# Patient Record
Sex: MALE | Race: WHITE | Employment: UNEMPLOYED | URBAN - METROPOLITAN AREA
[De-identification: names, ages, dates, MRNs, and addresses within clinical notes are randomized per-mention and may not be internally consistent; named-entity substitution may affect disease eponyms.]

---

## 2017-01-13 ENCOUNTER — OFFICE VISIT (OUTPATIENT)
Dept: INTERNAL MEDICINE CLINIC | Age: 22
End: 2017-01-13

## 2017-01-13 VITALS
TEMPERATURE: 98.1 F | HEIGHT: 67 IN | OXYGEN SATURATION: 98 % | SYSTOLIC BLOOD PRESSURE: 108 MMHG | RESPIRATION RATE: 12 BRPM | HEART RATE: 70 BPM | WEIGHT: 145.6 LBS | DIASTOLIC BLOOD PRESSURE: 76 MMHG | BODY MASS INDEX: 22.85 KG/M2

## 2017-01-13 DIAGNOSIS — G47.9 SLEEP DISTURBANCE: ICD-10-CM

## 2017-01-13 DIAGNOSIS — R41.840 ATTENTION AND CONCENTRATION DEFICIT: Primary | ICD-10-CM

## 2017-01-13 RX ORDER — DEXTROAMPHETAMINE SACCHARATE, AMPHETAMINE ASPARTATE MONOHYDRATE, DEXTROAMPHETAMINE SULFATE AND AMPHETAMINE SULFATE 5; 5; 5; 5 MG/1; MG/1; MG/1; MG/1
20 CAPSULE, EXTENDED RELEASE ORAL
Qty: 30 CAP | Refills: 0 | Status: SHIPPED | OUTPATIENT
Start: 2017-01-13 | End: 2017-02-12

## 2017-01-13 RX ORDER — DEXTROAMPHETAMINE SACCHARATE, AMPHETAMINE ASPARTATE MONOHYDRATE, DEXTROAMPHETAMINE SULFATE AND AMPHETAMINE SULFATE 5; 5; 5; 5 MG/1; MG/1; MG/1; MG/1
20 CAPSULE, EXTENDED RELEASE ORAL
COMMUNITY
End: 2017-01-13 | Stop reason: SDUPTHER

## 2017-01-13 RX ORDER — DEXTROAMPHETAMINE SACCHARATE, AMPHETAMINE ASPARTATE MONOHYDRATE, DEXTROAMPHETAMINE SULFATE AND AMPHETAMINE SULFATE 5; 5; 5; 5 MG/1; MG/1; MG/1; MG/1
20 CAPSULE, EXTENDED RELEASE ORAL
Qty: 30 CAP | Refills: 0 | Status: SHIPPED | OUTPATIENT
Start: 2017-02-13 | End: 2017-03-15

## 2017-01-13 RX ORDER — DEXTROAMPHETAMINE SACCHARATE, AMPHETAMINE ASPARTATE MONOHYDRATE, DEXTROAMPHETAMINE SULFATE AND AMPHETAMINE SULFATE 5; 5; 5; 5 MG/1; MG/1; MG/1; MG/1
20 CAPSULE, EXTENDED RELEASE ORAL
Qty: 30 CAP | Refills: 0 | Status: SHIPPED | OUTPATIENT
Start: 2017-03-16 | End: 2017-04-15

## 2017-01-13 NOTE — PROGRESS NOTES
Chai Nathan is a 19 y.o. male who was seen in clinic today (1/13/2017). Assessment & Plan:  Vinod Fuller was seen today for attention deficit disorder. Diagnoses and all orders for this visit:    Attention and concentration deficit- stable, continue current treatment, using meds sporadically, no concerns, [facility]   -     amphetamine-dextroamphetamine XR (ADDERALL XR) 20 mg XR capsule; Take 1 Cap (20 mg total) by mouth every morningEarliest Fill Date: 1/13/17. Max Daily Amount: 20 mg  -     amphetamine-dextroamphetamine XR (ADDERALL XR) 20 mg XR capsule; Take 1 Cap (20 mg total) by mouth every morningEarliest Fill Date: 2/13/17. Max Daily Amount: 20 mg  -     amphetamine-dextroamphetamine XR (ADDERALL XR) 20 mg XR capsule; Take 1 Cap (20 mg total) by mouth every morningEarliest Fill Date: 3/16/17. Max Daily Amount: 20 mg    Sleep disturbance- pt denies any issues, reviewed neuropsych testing & sleep referral from 2-3 yrs ago. Recommended since it has been so long to get another opinion from a different sleep specialist to verify we are truly treating him correctly. Reviewed some disorders are treated w/ stimulants (in which case no changes). Pt was not happy about this requirement.  -     SLEEP MEDICINE REFERRAL         Follow-up Disposition:  Return in about 6 months (around 7/13/2017), or if symptoms worsen or fail to improve.       ----------------------------------------------------------------------    Subjective:  Mental Health Review  Patient is seen for ADHD. Current treatment regimen includes: Adderall XR. Medication compliance: weekends and school holidays off. He is using less frequently over breaks. Pt reports the following side effects: nothing. [facility]. Prior to Admission medications    Medication Sig Start Date End Date Taking? Authorizing Provider   amphetamine-dextroamphetamine XR (ADDERALL XR) 20 mg XR capsule Take 20 mg by mouth every morning.    Yes Historical Provider   valACYclovir (VALTREX) 500 mg tablet Take 1,000 mg by mouth two (2) times daily as needed. Yes Historical Provider   triamcinolone (NASACORT AQ) 55 mcg nasal inhaler 1 [place] by Nasal route daily as needed. Alternates nostrils   Yes Historical Provider          No Known Allergies        Review of Systems   Respiratory: Negative for cough and shortness of breath. Cardiovascular: Negative for chest pain and palpitations. Gastrointestinal: Negative for abdominal pain, constipation, diarrhea, nausea and vomiting. Neurological: Negative for tingling and headaches. Psychiatric/Behavioral: Negative for depression and memory loss. The patient is not nervous/anxious and does not have insomnia. Objective:   Physical Exam   Cardiovascular: Regular rhythm and normal heart sounds. No murmur heard. Pulmonary/Chest: Effort normal and breath sounds normal. He has no wheezes. He has no rales. Abdominal: Soft. Bowel sounds are normal. He exhibits no mass. There is no hepatosplenomegaly. There is no tenderness. Visit Vitals    /76    Pulse 70    Temp 98.1 °F (36.7 °C) (Oral)    Resp 12    Ht 5' 7\" (1.702 m)    Wt 145 lb 9.6 oz (66 kg)    SpO2 98%    BMI 22.8 kg/m2         Disclaimer:  Advised him to call back or return to office if symptoms worsen/change/persist.  Discussed expected course/resolution/complications of diagnosis in detail with patient. Medication risks/benefits/costs/interactions/alternatives discussed with patient. He was given an after visit summary which includes diagnoses, current medications, & vitals. He expressed understanding with the diagnosis and plan.         Loida Wynne MD

## 2017-07-11 ENCOUNTER — OFFICE VISIT (OUTPATIENT)
Dept: INTERNAL MEDICINE CLINIC | Age: 22
End: 2017-07-11

## 2017-07-11 VITALS
SYSTOLIC BLOOD PRESSURE: 92 MMHG | BODY MASS INDEX: 23.2 KG/M2 | TEMPERATURE: 98 F | OXYGEN SATURATION: 98 % | WEIGHT: 147.8 LBS | HEIGHT: 67 IN | DIASTOLIC BLOOD PRESSURE: 66 MMHG | HEART RATE: 72 BPM | RESPIRATION RATE: 16 BRPM

## 2017-07-11 DIAGNOSIS — R41.840 ATTENTION AND CONCENTRATION DEFICIT: Primary | ICD-10-CM

## 2017-07-11 RX ORDER — DEXTROAMPHETAMINE SACCHARATE, AMPHETAMINE ASPARTATE MONOHYDRATE, DEXTROAMPHETAMINE SULFATE AND AMPHETAMINE SULFATE 5; 5; 5; 5 MG/1; MG/1; MG/1; MG/1
20 CAPSULE, EXTENDED RELEASE ORAL DAILY
Qty: 30 CAP | Refills: 0 | Status: CANCELLED | OUTPATIENT
Start: 2017-08-10 | End: 2017-09-08

## 2017-07-11 RX ORDER — DEXTROAMPHETAMINE SACCHARATE, AMPHETAMINE ASPARTATE MONOHYDRATE, DEXTROAMPHETAMINE SULFATE AND AMPHETAMINE SULFATE 5; 5; 5; 5 MG/1; MG/1; MG/1; MG/1
20 CAPSULE, EXTENDED RELEASE ORAL DAILY
Qty: 30 CAP | Refills: 0 | Status: CANCELLED | OUTPATIENT
Start: 2017-09-09 | End: 2017-10-08

## 2017-07-11 RX ORDER — DEXTROAMPHETAMINE SACCHARATE, AMPHETAMINE ASPARTATE MONOHYDRATE, DEXTROAMPHETAMINE SULFATE AND AMPHETAMINE SULFATE 3.75; 3.75; 3.75; 3.75 MG/1; MG/1; MG/1; MG/1
15 CAPSULE, EXTENDED RELEASE ORAL DAILY
Qty: 30 CAP | Refills: 0 | Status: SHIPPED | OUTPATIENT
Start: 2017-07-11 | End: 2017-10-26 | Stop reason: SDUPTHER

## 2017-07-11 RX ORDER — DEXTROAMPHETAMINE SACCHARATE, AMPHETAMINE ASPARTATE MONOHYDRATE, DEXTROAMPHETAMINE SULFATE AND AMPHETAMINE SULFATE 5; 5; 5; 5 MG/1; MG/1; MG/1; MG/1
20 CAPSULE, EXTENDED RELEASE ORAL
COMMUNITY
End: 2017-07-11 | Stop reason: DRUGHIGH

## 2017-07-11 NOTE — PROGRESS NOTES
Raul Michaels is a 23 y.o. male who was seen in clinic today (7/11/2017). Assessment & Plan:  Diagnoses and all orders for this visit:    1. Attention and concentration deficit- stable, continue current treatment but will decrease from 20mg to 15mg, he did not see specialist as previously directed, reviewed at the rate he uses meds #30 tabs should last him 2-3 months. To get further refill he will need to set up appt w/ specialist.  Reviewed need to verify we are treating him properly especially after having side effects w/ meds. -     amphetamine-dextroamphetamine XR (ADDERALL XR) 15 mg XR capsule; Take 1 Cap (15 mg total) by mouth dailyEarliest Fill Date: 7/11/17. Max Daily Amount: 15 mg         Follow-up Disposition:  Return in about 6 months (around 1/11/2018). ----------------------------------------------------------------------    Subjective:  Arron Castillo was seen today for Attention Deficit Disorder    Mental Health Review  Patient is seen for ADHD. Current treatment regimen includes: Adderall XR. Medication compliance: he reports 2-3 times per week. Pt reports the following side effects: he reports dentist mentioned he was grinding his teeth, he monitored, and agrees he does clench his teeth. Grinding improves off the medication. [facility]. ----------------------------------------------------------------------      Prior to Admission medications    Medication Sig Start Date End Date Taking? Authorizing Provider   amphetamine-dextroamphetamine XR (ADDERALL XR) 20 mg XR capsule Take 20 mg by mouth every morning. Yes Historical Provider   valACYclovir (VALTREX) 500 mg tablet Take 1,000 mg by mouth two (2) times daily as needed. Yes Historical Provider   triamcinolone (NASACORT AQ) 55 mcg nasal inhaler 1 [place] by Nasal route daily as needed.  Alternates nostrils   Yes Historical Provider          No Known Allergies        Review of Systems   Respiratory: Negative for cough and shortness of breath. Cardiovascular: Negative for chest pain and palpitations. Gastrointestinal: Negative for abdominal pain, constipation, diarrhea, nausea and vomiting. Objective:   Physical Exam   Constitutional: No distress. Eyes: Conjunctivae are normal. No scleral icterus. Cardiovascular: Regular rhythm and normal heart sounds. No murmur heard. Pulmonary/Chest: Effort normal and breath sounds normal. He has no wheezes. He has no rales. Visit Vitals    BP 92/66    Pulse 72    Temp 98 °F (36.7 °C) (Oral)    Resp 16    Ht 5' 7\" (1.702 m)    Wt 147 lb 12.8 oz (67 kg)    SpO2 98%    BMI 23.15 kg/m2         Disclaimer:  Advised him to call back or return to office if symptoms worsen/change/persist.  Discussed expected course/resolution/complications of diagnosis in detail with patient. Medication risks/benefits/costs/interactions/alternatives discussed with patient. He was given an after visit summary which includes diagnoses, current medications, & vitals. He expressed understanding with the diagnosis and plan.         Ceferino Youssef MD

## 2017-09-19 ENCOUNTER — TELEPHONE (OUTPATIENT)
Dept: INTERNAL MEDICINE CLINIC | Age: 22
End: 2017-09-19

## 2017-09-19 NOTE — TELEPHONE ENCOUNTER
Patient has decided he would like to have a sleep study at [facility] and needs a referral.  He will pick this up at the .

## 2017-09-20 ENCOUNTER — TELEPHONE (OUTPATIENT)
Dept: INTERNAL MEDICINE CLINIC | Age: 22
End: 2017-09-20

## 2017-09-20 DIAGNOSIS — G47.9 SLEEP DISTURBANCE: Primary | ICD-10-CM

## 2017-10-26 ENCOUNTER — OFFICE VISIT (OUTPATIENT)
Dept: INTERNAL MEDICINE CLINIC | Age: 22
End: 2017-10-26

## 2017-10-26 VITALS
OXYGEN SATURATION: 99 % | HEART RATE: 60 BPM | WEIGHT: 148 LBS | RESPIRATION RATE: 12 BRPM | TEMPERATURE: 98.7 F | BODY MASS INDEX: 23.23 KG/M2 | SYSTOLIC BLOOD PRESSURE: 104 MMHG | DIASTOLIC BLOOD PRESSURE: 76 MMHG | HEIGHT: 67 IN

## 2017-10-26 DIAGNOSIS — R41.840 ATTENTION AND CONCENTRATION DEFICIT: Primary | ICD-10-CM

## 2017-10-26 RX ORDER — DEXTROAMPHETAMINE SACCHARATE, AMPHETAMINE ASPARTATE MONOHYDRATE, DEXTROAMPHETAMINE SULFATE AND AMPHETAMINE SULFATE 3.75; 3.75; 3.75; 3.75 MG/1; MG/1; MG/1; MG/1
15 CAPSULE, EXTENDED RELEASE ORAL
Qty: 30 CAP | Refills: 0 | Status: CANCELLED | OUTPATIENT
Start: 2017-12-25 | End: 2018-01-24

## 2017-10-26 RX ORDER — DEXTROAMPHETAMINE SACCHARATE, AMPHETAMINE ASPARTATE MONOHYDRATE, DEXTROAMPHETAMINE SULFATE AND AMPHETAMINE SULFATE 2.5; 2.5; 2.5; 2.5 MG/1; MG/1; MG/1; MG/1
10 CAPSULE, EXTENDED RELEASE ORAL DAILY
Qty: 30 CAP | Refills: 0 | Status: SHIPPED | OUTPATIENT
Start: 2017-10-26 | End: 2018-02-21 | Stop reason: SDUPTHER

## 2017-10-26 RX ORDER — DEXTROAMPHETAMINE SACCHARATE, AMPHETAMINE ASPARTATE MONOHYDRATE, DEXTROAMPHETAMINE SULFATE AND AMPHETAMINE SULFATE 3.75; 3.75; 3.75; 3.75 MG/1; MG/1; MG/1; MG/1
15 CAPSULE, EXTENDED RELEASE ORAL
Qty: 30 CAP | Refills: 0 | Status: CANCELLED | OUTPATIENT
Start: 2017-11-25 | End: 2017-12-25

## 2017-10-26 NOTE — MR AVS SNAPSHOT
Visit Information Date & Time Provider Department Dept. Phone Encounter #  
 10/26/2017  9:15 AM Denice Kwon, [address] [facility] [phone] Follow-up Instructions Return in about 6 months (around 4/26/2018) for Regular follow up. Upcoming Health Maintenance Date Due DTaP/Tdap/Td series (2 - Td) 4/7/2021 Allergies as of 10/26/2017  Review Complete On: 10/26/2017 By: Denice Kwon MD  
 No Known Allergies Current Immunizations  Reviewed on 10/26/2017 Name Date Hep A Vaccine 3/24/2009, 3/19/2007 Hep B Vaccine 1993, 1993, 1993 Influenza Vaccine 10/5/2017, 10/15/2016, 1/13/2015, 9/1/2013 MMR 4/10/1998, 7/11/1994 Meningococcal ACWY Vaccine 4/6/2012, 5/6/2005 TB Skin Test (PPD) Intradermal 1/20/2015 Tdap 4/7/2011 Varicella Virus Vaccine 6/10/2008, 5/15/1997 Reviewed by Shanta San RN on 10/26/2017 at  9:25 AM  
You Were Diagnosed With   
  
 Codes Comments Attention and concentration deficit    -  Primary ICD-10-CM: R41.840 ICD-9-CM: 799.51 Vitals BP Pulse Temp Resp Height(growth percentile) Weight(growth percentile) 104/76 60 98.7 °F (37.1 °C) (Oral) 12 5' 7\" (1.702 m) 148 lb (67.1 kg) SpO2 BMI Smoking Status 99% 23.18 kg/m2 Never Smoker Vitals History BMI and BSA Data Body Mass Index Body Surface Area  
 23.18 kg/m 2 1.78 m 2 Preferred Pharmacy Pharmacy Name Phone [facility] [phone] Your Updated Medication List  
  
   
This list is accurate as of: 10/26/17  9:44 AM.  Always use your most recent med list.  
  
  
  
  
 amphetamine-dextroamphetamine XR 10 mg XR capsule Commonly known as:  ADDERALL XR Take 1 Cap (10 mg total) by mouth dailyEarliest Fill Date: 10/26/17. Max Daily Amount: 10 mg  
  
 NASACORT AQ 55 mcg nasal inhaler Generic drug:  triamcinolone 1 [place] by Nasal route daily as needed. Alternates nostrils  
  
 valACYclovir 500 mg tablet Commonly known as:  VALTREX Take 1,000 mg by mouth two (2) times daily as needed. Prescriptions Printed Refills  
 amphetamine-dextroamphetamine XR (ADDERALL XR) 10 mg XR capsule 0 Sig: Take 1 Cap (10 mg total) by mouth dailyEarliest Fill Date: 10/26/17. Max Daily Amount: 10 mg  
 Class: Print Route: Oral  
  
Follow-up Instructions Return in about 6 months (around 4/26/2018) for Regular follow up. Patient Instructions Travel Clinic: 
 
[facility] in [place] [address] [phone] [address] [place] [address],[address] 
 [phone] [email or web address] Introducing [facility]! Dear Alie León: Thank you for requesting a [employer] account. Our records indicate that you already have an active [employer]ivivo account. You can access your account anytime at [email or web address] V I O/better. Did you know that you can access your hospital and ER discharge instructions at any time in [employer]ivivo? You can also review all of your test results from your hospital stay or ER visit. Additional Information If you have questions, please visit the Frequently Asked Questions section of the [employer] website at [email or web address] V I O/better./. Remember, [employer]ivivo is NOT to be used for urgent needs. For medical emergencies, dial 911. Now available from your iPhone and Android! Please provide this summary of care documentation to your next provider. Your primary care clinician is listed as Yassine Jaramillo. If you have any questions after today's visit, please call [phone].

## 2017-10-26 NOTE — PATIENT INSTRUCTIONS
Travel Clinic:    [facility] in [address] [place] [address]   [address]   [phone]    [email or web address] [employer]. [facility]

## 2017-10-26 NOTE — PROGRESS NOTES
Zully Blackman is a 23 y.o. male who was seen in clinic today (10/26/2017). Assessment & Plan:  Diagnoses and all orders for this visit:    1. Attention and concentration deficit- well controlled, continue current meds but will decrease from 15mg to 10mg as a trial.  He will update me in 2-3 wks. He has appt w/ [facility] sleep specialist set up.  -     amphetamine-dextroamphetamine XR (ADDERALL XR) 10 mg XR capsule; Take 1 Cap (10 mg total) by mouth dailyEarliest Fill Date: 10/26/17. Max Daily Amount: 10 mg         Follow-up Disposition:  Return in about 6 months (around 4/26/2018) for Regular follow up.       ----------------------------------------------------------------------    Subjective:  Jonah Caraballo was seen today for Attention Deficit Disorder and Sleep Problem    Mental Health Review  Patient is seen for ADHD. Current treatment regimen includes: Adderall XR. Medication compliance: taking it several day per week. He did run out last week. 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 Pt reports the following side effects: nothing. Dose was lowered at last visit due to grinding of the teeth and this has improved. He reports no significant changes on the lower dose. [facility] reviewed. He is going to [place] in January '18. Prior to Admission medications    Medication Sig Start Date End Date Taking? Authorizing Provider   amphetamine-dextroamphetamine XR (ADDERALL XR) 15 mg XR capsule Take 1 Cap (15 mg total) by mouth dailyEarliest Fill Date: 7/11/17. Max Daily Amount: 15 mg 7/11/17  Yes Loida Wynne MD   valACYclovir (VALTREX) 500 mg tablet Take 1,000 mg by mouth two (2) times daily as needed. Yes Historical Provider   triamcinolone (NASACORT AQ) 55 mcg nasal inhaler 1 [place] by Nasal route daily as needed.  Alternates nostrils   Yes Historical Provider          No Known Allergies        Review of Systems   Respiratory: Negative for cough and shortness of breath. Cardiovascular: Negative for chest pain and palpitations. Gastrointestinal: Negative for abdominal pain, constipation, diarrhea, nausea and vomiting. Objective:   Physical Exam   Constitutional: No distress. Cardiovascular: Regular rhythm and normal heart sounds. No murmur heard. Pulmonary/Chest: Effort normal and breath sounds normal. He has no wheezes. He has no rales. Abdominal: Soft. Bowel sounds are normal. He exhibits no mass. There is no hepatosplenomegaly. There is no tenderness. Psychiatric: He has a normal mood and affect. His behavior is normal.         Visit Vitals    /76    Pulse 60    Temp 98.7 °F (37.1 °C) (Oral)    Resp 12    Ht 5' 7\" (1.702 m)    Wt 148 lb (67.1 kg)    SpO2 99%    BMI 23.18 kg/m2         Disclaimer:  Advised him to call back or return to office if symptoms worsen/change/persist.  Discussed expected course/resolution/complications of diagnosis in detail with patient. Medication risks/benefits/costs/interactions/alternatives discussed with patient. He was given an after visit summary which includes diagnoses, current medications, & vitals. He expressed understanding with the diagnosis and plan.         Dylon Nunez MD

## 2017-12-20 ENCOUNTER — OFFICE VISIT (OUTPATIENT)
Dept: INTERNAL MEDICINE CLINIC | Age: 22
End: 2017-12-20

## 2017-12-20 VITALS
TEMPERATURE: 97.6 F | DIASTOLIC BLOOD PRESSURE: 76 MMHG | RESPIRATION RATE: 12 BRPM | BODY MASS INDEX: 24.01 KG/M2 | SYSTOLIC BLOOD PRESSURE: 104 MMHG | OXYGEN SATURATION: 98 % | HEIGHT: 67 IN | HEART RATE: 64 BPM | WEIGHT: 153 LBS

## 2017-12-20 DIAGNOSIS — R41.840 ATTENTION AND CONCENTRATION DEFICIT: Primary | ICD-10-CM

## 2017-12-20 RX ORDER — DEXTROAMPHETAMINE SACCHARATE, AMPHETAMINE ASPARTATE MONOHYDRATE, DEXTROAMPHETAMINE SULFATE AND AMPHETAMINE SULFATE 3.75; 3.75; 3.75; 3.75 MG/1; MG/1; MG/1; MG/1
15 CAPSULE, EXTENDED RELEASE ORAL
Qty: 20 CAP | Refills: 0 | Status: SHIPPED | OUTPATIENT
Start: 2017-12-20 | End: 2018-02-21 | Stop reason: SDUPTHER

## 2017-12-20 RX ORDER — DEXTROAMPHETAMINE SACCHARATE, AMPHETAMINE ASPARTATE MONOHYDRATE, DEXTROAMPHETAMINE SULFATE AND AMPHETAMINE SULFATE 2.5; 2.5; 2.5; 2.5 MG/1; MG/1; MG/1; MG/1
10 CAPSULE, EXTENDED RELEASE ORAL
Qty: 30 CAP | Refills: 0 | Status: CANCELLED | OUTPATIENT
Start: 2018-02-18 | End: 2018-03-20

## 2017-12-20 RX ORDER — DEXTROAMPHETAMINE SACCHARATE, AMPHETAMINE ASPARTATE MONOHYDRATE, DEXTROAMPHETAMINE SULFATE AND AMPHETAMINE SULFATE 3.75; 3.75; 3.75; 3.75 MG/1; MG/1; MG/1; MG/1
15 CAPSULE, EXTENDED RELEASE ORAL
Qty: 30 CAP | Refills: 0 | Status: SHIPPED | OUTPATIENT
Start: 2018-01-19 | End: 2018-02-20 | Stop reason: SDUPTHER

## 2017-12-20 RX ORDER — DEXTROAMPHETAMINE SACCHARATE, AMPHETAMINE ASPARTATE MONOHYDRATE, DEXTROAMPHETAMINE SULFATE AND AMPHETAMINE SULFATE 2.5; 2.5; 2.5; 2.5 MG/1; MG/1; MG/1; MG/1
10 CAPSULE, EXTENDED RELEASE ORAL
COMMUNITY
End: 2017-12-20 | Stop reason: SDUPTHER

## 2017-12-20 NOTE — Clinical Note
Fax my note & neuropsych testing to [facility] sleep specialist (note is in my green folder)

## 2017-12-20 NOTE — PROGRESS NOTES
Leoncio Call is a 23 y.o. male who was seen in clinic today (12/20/2017). Assessment & Plan:   Diagnoses and all orders for this visit:    1. Attention and concentration deficit- slightly worse, will increase dose back to 15mg, [facility] and [facility] reviewed. -     amphetamine-dextroamphetamine XR (ADDERALL XR) 15 mg XR capsule; Take 1 Cap (15 mg total) by mouth every morningEarliest Fill Date: 1/19/18. Max Daily Amount: 15 mg  -     amphetamine-dextroamphetamine XR (ADDERALL XR) 15 mg XR capsule; Take 1 Cap (15 mg total) by mouth every morning. Max Daily Amount: 15 mg      [facility] records reviewed. Will send copy of my note and copy of previous neuropsychology testing to him for his review and input. Follow-up Disposition:  Return in about 6 months (around 6/20/2018) for Regular follow up. Subjective:   Nick Mera was seen today for Attention Deficit Disorder    Mental Health Review  Patient is seen for ADHD. Current treatment regimen includes: Adderall XR. At last visit we decreased dose from 15mg to 10mg. he reports meds are wearing off sooner. He reports slight worsening of concentration. Medication compliance: weekends and school holidays off. Pt reports the following side effects: nothing- grinding of his teeth has helped. [facility]. [facility], notes reviewed. He has home sleep study set up for Jan '18. Prior to Admission medications    Medication Sig Start Date End Date Taking? Authorizing Provider   amphetamine-dextroamphetamine XR (ADDERALL XR) 10 mg XR capsule Take 10 mg by mouth every morning. Yes Historical Provider   valACYclovir (VALTREX) 500 mg tablet Take 1,000 mg by mouth two (2) times daily as needed. Yes Historical Provider   triamcinolone (NASACORT AQ) 55 mcg nasal inhaler 1 [place] by Nasal route daily as needed.  Alternates nostrils   Yes Historical Provider          No Known Allergies Review of Systems   Respiratory: Negative for cough and shortness of breath. Cardiovascular: Negative for chest pain and palpitations. Gastrointestinal: Negative for abdominal pain, constipation, diarrhea, nausea and vomiting. Objective:   Physical Exam   Constitutional: No distress. Cardiovascular: Regular rhythm and normal heart sounds. No murmur heard. Pulmonary/Chest: Effort normal and breath sounds normal.   Psychiatric: He has a normal mood and affect. His behavior is normal.         Visit Vitals    /76    Pulse 64    Temp 97.6 °F (36.4 °C) (Oral)    Resp 12    Ht 5' 7\" (1.702 m)    Wt 153 lb (69.4 kg)    SpO2 98%    BMI 23.96 kg/m2         Disclaimer:  Advised him to call back or return to office if symptoms worsen/change/persist.  Discussed expected course/resolution/complications of diagnosis in detail with patient. Medication risks/benefits/costs/interactions/alternatives discussed with patient. He was given an after visit summary which includes diagnoses, current medications, & vitals. He expressed understanding with the diagnosis and plan. Aspects of this note may have been generated using voice recognition software. Despite editing, there may be some syntax errors.        Tracie Murguia MD

## 2017-12-21 ENCOUNTER — TELEPHONE (OUTPATIENT)
Dept: INTERNAL MEDICINE CLINIC | Age: 22
End: 2017-12-21

## 2017-12-21 NOTE — TELEPHONE ENCOUNTER
Records faxed per order of Dr. Daniel Botello, last office note and neuropsych testing. Confirmation received.

## 2017-12-26 ENCOUNTER — TELEPHONE (OUTPATIENT)
Dept: INTERNAL MEDICINE CLINIC | Age: 22
End: 2017-12-26

## 2017-12-26 RX ORDER — AZITHROMYCIN 500 MG/1
500 TABLET, FILM COATED ORAL DAILY
Qty: 3 TAB | Refills: 0 | Status: SHIPPED | OUTPATIENT
Start: 2017-12-26 | End: 2017-12-29

## 2017-12-26 NOTE — TELEPHONE ENCOUNTER
Called pt and pt stated he went to [address] and they recommended pt take an antibiotic for his travel to [place].

## 2017-12-26 NOTE — TELEPHONE ENCOUNTER
The patient went to [facility] and they recommend antibiotic.  Can you call this in for me

## 2017-12-26 NOTE — TELEPHONE ENCOUNTER
Yinka [address] to clarify what pt needed for his travel to [place]. Spoke with nurse practioner and she recommended for pt to take an otc anitdiarrheal and azithromycin 500 mg # 3 tabs for onset of diarrhea for pt PCP to prescribe.

## 2018-02-20 DIAGNOSIS — R41.840 ATTENTION AND CONCENTRATION DEFICIT: ICD-10-CM

## 2018-02-21 RX ORDER — DEXTROAMPHETAMINE SACCHARATE, AMPHETAMINE ASPARTATE MONOHYDRATE, DEXTROAMPHETAMINE SULFATE AND AMPHETAMINE SULFATE 3.75; 3.75; 3.75; 3.75 MG/1; MG/1; MG/1; MG/1
15 CAPSULE, EXTENDED RELEASE ORAL DAILY
Qty: 30 CAP | Refills: 0 | Status: SHIPPED | OUTPATIENT
Start: 2018-04-22 | End: 2018-07-30 | Stop reason: SDUPTHER

## 2018-02-21 RX ORDER — DEXTROAMPHETAMINE SACCHARATE, AMPHETAMINE ASPARTATE MONOHYDRATE, DEXTROAMPHETAMINE SULFATE AND AMPHETAMINE SULFATE 3.75; 3.75; 3.75; 3.75 MG/1; MG/1; MG/1; MG/1
15 CAPSULE, EXTENDED RELEASE ORAL
Qty: 30 CAP | Refills: 0 | Status: SHIPPED | OUTPATIENT
Start: 2018-02-21 | End: 2018-03-23

## 2018-02-21 RX ORDER — DEXTROAMPHETAMINE SACCHARATE, AMPHETAMINE ASPARTATE MONOHYDRATE, DEXTROAMPHETAMINE SULFATE AND AMPHETAMINE SULFATE 3.75; 3.75; 3.75; 3.75 MG/1; MG/1; MG/1; MG/1
15 CAPSULE, EXTENDED RELEASE ORAL
Qty: 30 CAP | Refills: 0 | Status: SHIPPED | OUTPATIENT
Start: 2018-03-23 | End: 2018-04-22

## 2018-07-30 ENCOUNTER — OFFICE VISIT (OUTPATIENT)
Dept: INTERNAL MEDICINE CLINIC | Age: 23
End: 2018-07-30

## 2018-07-30 VITALS
SYSTOLIC BLOOD PRESSURE: 96 MMHG | RESPIRATION RATE: 14 BRPM | HEART RATE: 60 BPM | HEIGHT: 67 IN | WEIGHT: 147 LBS | TEMPERATURE: 97.6 F | BODY MASS INDEX: 23.07 KG/M2 | OXYGEN SATURATION: 98 % | DIASTOLIC BLOOD PRESSURE: 66 MMHG

## 2018-07-30 DIAGNOSIS — R41.840 ATTENTION AND CONCENTRATION DEFICIT: Primary | ICD-10-CM

## 2018-07-30 DIAGNOSIS — G47.9 SLEEP DISTURBANCE: ICD-10-CM

## 2018-07-30 RX ORDER — DEXTROAMPHETAMINE SACCHARATE, AMPHETAMINE ASPARTATE MONOHYDRATE, DEXTROAMPHETAMINE SULFATE AND AMPHETAMINE SULFATE 3.75; 3.75; 3.75; 3.75 MG/1; MG/1; MG/1; MG/1
15 CAPSULE, EXTENDED RELEASE ORAL
Qty: 30 CAP | Refills: 0 | Status: SHIPPED | OUTPATIENT
Start: 2018-09-28 | End: 2018-10-28

## 2018-07-30 RX ORDER — DEXTROAMPHETAMINE SACCHARATE, AMPHETAMINE ASPARTATE MONOHYDRATE, DEXTROAMPHETAMINE SULFATE AND AMPHETAMINE SULFATE 3.75; 3.75; 3.75; 3.75 MG/1; MG/1; MG/1; MG/1
15 CAPSULE, EXTENDED RELEASE ORAL
Qty: 30 CAP | Refills: 0 | Status: SHIPPED | OUTPATIENT
Start: 2018-08-29 | End: 2018-09-28

## 2018-07-30 RX ORDER — DEXTROAMPHETAMINE SACCHARATE, AMPHETAMINE ASPARTATE MONOHYDRATE, DEXTROAMPHETAMINE SULFATE AND AMPHETAMINE SULFATE 3.75; 3.75; 3.75; 3.75 MG/1; MG/1; MG/1; MG/1
15 CAPSULE, EXTENDED RELEASE ORAL DAILY
Qty: 30 CAP | Refills: 0 | Status: SHIPPED | OUTPATIENT
Start: 2018-07-30 | End: 2018-08-29

## 2018-07-30 NOTE — PROGRESS NOTES
Ricardo Contreras is a 20 y.o. male who was seen in clinic today (7/30/2018). He is planning on applying to [address] [place] [address] [employer] for next summer. Assessment & Plan:   Diagnoses and all orders for this visit:    1. Attention and concentration deficit- well controlled, continue current treatment, [facility] & refills have been sporadic, depending on scheduling using more regularly then at other times. No changes. -     amphetamine-dextroamphetamine XR (ADDERALL XR) 15 mg XR capsule; Take 1 Cap (15 mg total) by mouth dailyEarliest Fill Date: 7/30/18. Max Daily Amount: 15 mg  -     amphetamine-dextroamphetamine XR (ADDERALL XR) 15 mg XR capsule; Take 1 Cap (15 mg total) by mouth every morningEarliest Fill Date: 8/29/18. Max Daily Amount: 15 mg  -     amphetamine-dextroamphetamine XR (ADDERALL XR) 15 mg XR capsule; Take 1 Cap (15 mg total) by mouth every morningEarliest Fill Date: 9/28/18. Max Daily Amount: 15 mg    2. Sleep disturbance- no recent records, will obtain [facility], he reports was normal.         Follow-up Disposition:  Return if symptoms worsen or fail to improve. Subjective:   Alyssa Allen was seen today for Attention Deficit Disorder    Mental Health Review  Patient is seen for ADHD. Since last visit he had a good time in [place]. He reports completing sleep study and told it was normal.  Current treatment regimen includes: Adderall. Medication compliance: weekends and school holidays off. He is using meds more regularly now. Pt reports the following side effects: nothing. [facility]. Prior to Admission medications    Medication Sig Start Date End Date Taking? Authorizing Provider   amphetamine-dextroamphetamine XR (ADDERALL XR) 15 mg XR capsule Take 1 Cap (15 mg total) by mouth dailyEarliest Fill Date: 4/22/18. Max Daily Amount: 15 mg 4/22/18  Yes Magnus Wallace MD   valACYclovir (VALTREX) 500 mg tablet Take 1,000 mg by mouth two (2) times daily as needed. Yes Historical Provider   triamcinolone (NASACORT AQ) 55 mcg nasal inhaler 1 [place] by Nasal route daily as needed. Alternates nostrils   Yes Historical Provider          No Known Allergies        Review of Systems   Respiratory: Negative for cough and shortness of breath. Cardiovascular: Negative for chest pain and palpitations. Gastrointestinal: Negative for abdominal pain, constipation, diarrhea, nausea and vomiting. Objective:   Physical Exam   Constitutional: No distress. Cardiovascular: Regular rhythm and normal heart sounds. No murmur heard. Pulmonary/Chest: Effort normal and breath sounds normal. He has no wheezes. He has no rales. Abdominal: Soft. Bowel sounds are normal. He exhibits no mass. There is no hepatosplenomegaly. There is no tenderness. Psychiatric: He has a normal mood and affect. His behavior is normal.         Visit Vitals    BP 96/66    Pulse 60    Temp 97.6 °F (36.4 °C) (Oral)    Resp 14    Ht 5' 7\" (1.702 m)    Wt 147 lb (66.7 kg)    SpO2 98%    BMI 23.02 kg/m2         Disclaimer:  Advised him to call back or return to office if symptoms worsen/change/persist.  Discussed expected course/resolution/complications of diagnosis in detail with patient. Medication risks/benefits/costs/interactions/alternatives discussed with patient. He was given an after visit summary which includes diagnoses, current medications, & vitals. He expressed understanding with the diagnosis and plan. Aspects of this note may have been generated using voice recognition software. Despite editing, there may be some syntax errors.        Inez Saldivar MD

## 2018-11-27 ENCOUNTER — PATIENT MESSAGE (OUTPATIENT)
Dept: INTERNAL MEDICINE CLINIC | Age: 23
End: 2018-11-27

## 2018-11-27 ENCOUNTER — OFFICE VISIT (OUTPATIENT)
Dept: INTERNAL MEDICINE CLINIC | Age: 23
End: 2018-11-27

## 2018-11-27 VITALS
OXYGEN SATURATION: 98 % | BODY MASS INDEX: 23.7 KG/M2 | TEMPERATURE: 98.1 F | HEART RATE: 60 BPM | SYSTOLIC BLOOD PRESSURE: 96 MMHG | DIASTOLIC BLOOD PRESSURE: 68 MMHG | HEIGHT: 67 IN | WEIGHT: 151 LBS | RESPIRATION RATE: 18 BRPM

## 2018-11-27 DIAGNOSIS — J06.9 VIRAL UPPER RESPIRATORY TRACT INFECTION: ICD-10-CM

## 2018-11-27 DIAGNOSIS — R41.840 ATTENTION AND CONCENTRATION DEFICIT: Primary | ICD-10-CM

## 2018-11-27 LAB
S PYO AG THROAT QL: NEGATIVE
VALID INTERNAL CONTROL?: YES

## 2018-11-27 RX ORDER — DEXTROAMPHETAMINE SACCHARATE, AMPHETAMINE ASPARTATE MONOHYDRATE, DEXTROAMPHETAMINE SULFATE AND AMPHETAMINE SULFATE 3.75; 3.75; 3.75; 3.75 MG/1; MG/1; MG/1; MG/1
15 CAPSULE, EXTENDED RELEASE ORAL
Qty: 30 CAP | Refills: 0 | Status: SHIPPED | OUTPATIENT
Start: 2018-12-27 | End: 2019-01-26

## 2018-11-27 RX ORDER — DEXTROAMPHETAMINE SACCHARATE, AMPHETAMINE ASPARTATE MONOHYDRATE, DEXTROAMPHETAMINE SULFATE AND AMPHETAMINE SULFATE 3.75; 3.75; 3.75; 3.75 MG/1; MG/1; MG/1; MG/1
15 CAPSULE, EXTENDED RELEASE ORAL
Qty: 30 CAP | Refills: 0 | Status: SHIPPED | OUTPATIENT
Start: 2019-01-26 | End: 2019-05-20 | Stop reason: SDUPTHER

## 2018-11-27 RX ORDER — DEXTROAMPHETAMINE SACCHARATE, AMPHETAMINE ASPARTATE MONOHYDRATE, DEXTROAMPHETAMINE SULFATE AND AMPHETAMINE SULFATE 3.75; 3.75; 3.75; 3.75 MG/1; MG/1; MG/1; MG/1
15 CAPSULE, EXTENDED RELEASE ORAL
Qty: 30 CAP | Refills: 0 | Status: SHIPPED | OUTPATIENT
Start: 2018-11-27 | End: 2018-12-27

## 2018-11-27 RX ORDER — DEXTROAMPHETAMINE SACCHARATE, AMPHETAMINE ASPARTATE MONOHYDRATE, DEXTROAMPHETAMINE SULFATE AND AMPHETAMINE SULFATE 3.75; 3.75; 3.75; 3.75 MG/1; MG/1; MG/1; MG/1
15 CAPSULE, EXTENDED RELEASE ORAL
COMMUNITY
End: 2018-11-27 | Stop reason: SDUPTHER

## 2018-11-27 NOTE — PROGRESS NOTES
Amy Castillo is a 20 y.o. male who was seen in clinic today (11/27/2018). Assessment & Plan:   Diagnoses and all orders for this visit:    1. Attention and concentration deficit- well controlled, continue current treatment, [facility]  reviewed   -     amphetamine-dextroamphetamine XR (ADDERALL XR) 15 mg XR capsule; Take 1 Cap (15 mg total) by mouth every morningEarliest Fill Date: 11/27/18. Max Daily Amount: 15 mg  -     amphetamine-dextroamphetamine XR (ADDERALL XR) 15 mg XR capsule; Take 1 Cap (15 mg total) by mouth every morningEarliest Fill Date: 12/27/18. Max Daily Amount: 15 mg  -     amphetamine-dextroamphetamine XR (ADDERALL XR) 15 mg XR capsule; Take 1 Cap (15 mg total) by mouth every morningEarliest Fill Date: 1/26/19. Max Daily Amount: 15 mg    2. Viral upper respiratory tract infection- symptoms: fluctuating, discussed differential diagnosis and at this time favor a viral etiology. Discussed diagnosis & treatment options and reviewed the importance of avoiding unnecessary antibiotic therapy, reviewed which OTC medications to use and avoid, expected time course for resolution & red flags were reviewed with him to RTC or notify me. -     AMB POC RAPID STREP A  ---> NEGATIVE          Follow-up Disposition:  Return in about 6 months (around 5/27/2019) for Regular follow up. Subjective:   Gwen Franco was seen today for Attention Deficit Disorder and Cold Symptoms    Mental Health Review  Patient is seen for ADHD. Current treatment regimen includes: Adderall XR. Medication compliance: weekends and school holidays off. Pt reports the following side effects: nothing. VA  reviewed. URI Review  Gwen Franco returns to clinic today to talk about: URI symptoms for 4 days ago, which are unchanged since that time. He reports sore throat, dry cough, headache, chills and sinus congestion. He denies a history of: fever, chest congestion, wheezing and SOB/DODD.   Treatments have included: sudafed which have been somewhat effective. Relevant PMH: No pertinent additional PMH. Patient reports sick contacts: no.  Started after [facility] Friday shopping. Brief Labs:   No results found for: NA, K, CREA, CREATEXT, CHOL, HDL, LDLC, LDL, LDLCEXT, TRIGL, HBA1C, [id number], TSH, CYO2TPVW, TSHEXT       Prior to Admission medications    Medication Sig Start Date End Date Taking? Authorizing Provider   amphetamine-dextroamphetamine XR (ADDERALL XR) 15 mg XR capsule Take 15 mg by mouth every morning. Yes Provider, Historical   valACYclovir (VALTREX) 500 mg tablet Take 1,000 mg by mouth two (2) times daily as needed. Yes Provider, Historical   triamcinolone (NASACORT AQ) 55 mcg nasal inhaler 1 [place] by Nasal route daily as needed. Alternates nostrils   Yes Provider, Historical          No Known Allergies        Review of Systems   Respiratory: Negative for cough and shortness of breath. Cardiovascular: Negative for chest pain and palpitations. Gastrointestinal: Negative for abdominal pain, constipation, diarrhea, nausea and vomiting. Objective:   Physical Exam   Constitutional: No distress. HENT:   Right Ear: Tympanic membrane is not erythematous and not bulging. No middle ear effusion. Left Ear: Tympanic membrane is not erythematous and not bulging. No middle ear effusion. Nose: No mucosal edema or rhinorrhea. Right sinus exhibits no maxillary sinus tenderness and no frontal sinus tenderness. Left sinus exhibits no maxillary sinus tenderness and no frontal sinus tenderness. Mouth/Throat: Uvula is midline and mucous membranes are normal. Posterior oropharyngeal erythema present. No oropharyngeal exudate. Eyes: Conjunctivae are normal. No scleral icterus. Neck: Neck supple. Cardiovascular: Regular rhythm and normal heart sounds. No murmur heard. Pulmonary/Chest: Effort normal and breath sounds normal. He has no wheezes. He has no rales.    Lymphadenopathy:     He has no cervical adenopathy. Visit Vitals  BP 96/68   Pulse 60   Temp 98.1 °F (36.7 °C) (Oral)   Resp 18   Ht 5' 7\" (1.702 m)   Wt 151 lb (68.5 kg)   SpO2 98%   BMI 23.65 kg/m²         Disclaimer:  Advised him to call back or return to office if symptoms worsen/change/persist.  Discussed expected course/resolution/complications of diagnosis in detail with patient. Medication risks/benefits/costs/interactions/alternatives discussed with patient. He was given an after visit summary which includes diagnoses, current medications, & vitals. He expressed understanding with the diagnosis and plan. Aspects of this note may have been generated using voice recognition software. Despite editing, there may be some syntax errors.        Alirio Fields MD

## 2018-11-27 NOTE — PATIENT INSTRUCTIONS

## 2018-11-27 NOTE — TELEPHONE ENCOUNTER
Attempted to reach patient to advise strep test neg, VM full. Dr. Poornima Perez had sent [facility] message.

## 2019-05-20 ENCOUNTER — OFFICE VISIT (OUTPATIENT)
Dept: INTERNAL MEDICINE CLINIC | Age: 24
End: 2019-05-20

## 2019-05-20 VITALS
OXYGEN SATURATION: 98 % | TEMPERATURE: 98 F | BODY MASS INDEX: 24.14 KG/M2 | RESPIRATION RATE: 14 BRPM | HEIGHT: 67 IN | SYSTOLIC BLOOD PRESSURE: 104 MMHG | DIASTOLIC BLOOD PRESSURE: 68 MMHG | HEART RATE: 84 BPM | WEIGHT: 153.8 LBS

## 2019-05-20 DIAGNOSIS — B00.1 RECURRENT COLD SORES: ICD-10-CM

## 2019-05-20 DIAGNOSIS — Z13.220 LIPID SCREENING: ICD-10-CM

## 2019-05-20 DIAGNOSIS — R41.840 ATTENTION AND CONCENTRATION DEFICIT: Primary | ICD-10-CM

## 2019-05-20 DIAGNOSIS — Z51.81 MEDICATION MONITORING ENCOUNTER: ICD-10-CM

## 2019-05-20 RX ORDER — DEXTROAMPHETAMINE SACCHARATE, AMPHETAMINE ASPARTATE MONOHYDRATE, DEXTROAMPHETAMINE SULFATE AND AMPHETAMINE SULFATE 3.75; 3.75; 3.75; 3.75 MG/1; MG/1; MG/1; MG/1
15 CAPSULE, EXTENDED RELEASE ORAL
Qty: 30 CAP | Refills: 0 | Status: SHIPPED | OUTPATIENT
Start: 2019-06-19 | End: 2019-07-19

## 2019-05-20 RX ORDER — DEXTROAMPHETAMINE SACCHARATE, AMPHETAMINE ASPARTATE MONOHYDRATE, DEXTROAMPHETAMINE SULFATE AND AMPHETAMINE SULFATE 3.75; 3.75; 3.75; 3.75 MG/1; MG/1; MG/1; MG/1
15 CAPSULE, EXTENDED RELEASE ORAL
Qty: 30 CAP | Refills: 0 | Status: SHIPPED | OUTPATIENT
Start: 2019-07-19 | End: 2019-09-10 | Stop reason: SDUPTHER

## 2019-05-20 RX ORDER — VALACYCLOVIR HYDROCHLORIDE 500 MG/1
1000 TABLET, FILM COATED ORAL
Qty: 12 TAB | Refills: 0 | Status: SHIPPED | OUTPATIENT
Start: 2019-05-20

## 2019-05-20 RX ORDER — DEXTROAMPHETAMINE SACCHARATE, AMPHETAMINE ASPARTATE MONOHYDRATE, DEXTROAMPHETAMINE SULFATE AND AMPHETAMINE SULFATE 3.75; 3.75; 3.75; 3.75 MG/1; MG/1; MG/1; MG/1
15 CAPSULE, EXTENDED RELEASE ORAL
Qty: 30 CAP | Refills: 0 | Status: SHIPPED | OUTPATIENT
Start: 2019-05-20 | End: 2019-06-19

## 2019-05-20 NOTE — PROGRESS NOTES
Leoncio Call is a 30 y.o. male who was seen in clinic today (2019). Assessment & Plan:   Diagnoses and all orders for this visit:    1. Attention and concentration deficit- stable, continue current treatment, [facility]  reviewed   -     amphetamine-dextroamphetamine XR (ADDERALL XR) 15 mg XR capsule; Take 1 Cap by mouth every morning for 30 days. Max Daily Amount: 15 mg.  -     amphetamine-dextroamphetamine XR (ADDERALL XR) 15 mg XR capsule; Take 1 Cap by mouth every morning for 30 days. Max Daily Amount: 15 mg.  -     amphetamine-dextroamphetamine XR (ADDERALL XR) 15 mg XR capsule; Take 1 Cap by mouth every morning. Max Daily Amount: 15 mg.    2. Recurrent cold sores- meds , has not used recently, refilled to have on hand  -     valACYclovir (VALTREX) 500 mg tablet; Take 2 Tabs by mouth two (2) times daily as needed (cold sores). 3. Lipid screening  -     LIPID PANEL    4. Medication monitoring encounter  -     METABOLIC PANEL, COMPREHENSIVE  -     CBC W/O DIFF      Follow-up and Dispositions    · Follow up - 6 months            Subjective:   Nick Mera was seen today for Attention Deficit Disorder    Mental Health Review  Patient is seen for ADHD. Current treatment regimen includes: Adderall XR. Medication compliance: weekends and school holidays off. Pt reports the following side effects: nothing. [facility]  reviewed. Brief Labs:   No results found for: NA, K, CREA, CREATEXT, CHOL, HDL, LDLC, LDL, LDLCEXT, TRIGL, HBA1C, [id number], TSH, OOA6VNXL, TSHEXT       Prior to Admission medications    Medication Sig Start Date End Date Taking? Authorizing Provider   amphetamine-dextroamphetamine XR (ADDERALL XR) 15 mg XR capsule Take 1 Cap (15 mg total) by mouth every morningEarliest Fill Date: 19. Max Daily Amount: 15 mg 19  Yes Brooklyn Quinones MD   valACYclovir (VALTREX) 500 mg tablet Take 1,000 mg by mouth two (2) times daily as needed.    Yes Provider, Historical          No Known Allergies        Review of Systems   Respiratory: Negative for cough and shortness of breath. Cardiovascular: Negative for chest pain and palpitations. Gastrointestinal: Negative for abdominal pain, constipation, diarrhea, nausea and vomiting. Objective:   Physical Exam   Constitutional: No distress. Eyes: Conjunctivae are normal. No scleral icterus. Cardiovascular: Regular rhythm and normal heart sounds. No murmur heard. Pulmonary/Chest: Effort normal and breath sounds normal. He has no wheezes. He has no rales. Abdominal: Soft. Bowel sounds are normal. He exhibits no mass. There is no hepatosplenomegaly. There is no tenderness. Musculoskeletal: He exhibits no edema. Psychiatric: He has a normal mood and affect. His behavior is normal.         Visit Vitals  /68   Pulse 84   Temp 98 °F (36.7 °C) (Oral)   Resp 14   Ht 5' 7\" (1.702 m)   Wt 153 lb 12.8 oz (69.8 kg)   SpO2 98%   BMI 24.09 kg/m²         Disclaimer:  Advised him to call back or return to office if symptoms worsen/change/persist.  Discussed expected course/resolution/complications of diagnosis in detail with patient. Medication risks/benefits/costs/interactions/alternatives discussed with patient. He was given an after visit summary which includes diagnoses, current medications, & vitals. He expressed understanding with the diagnosis and plan. Aspects of this note may have been generated using voice recognition software. Despite editing, there may be some syntax errors.        Jana Bragg MD

## 2019-09-10 ENCOUNTER — TELEPHONE (OUTPATIENT)
Dept: INTERNAL MEDICINE CLINIC | Age: 24
End: 2019-09-10

## 2019-09-10 DIAGNOSIS — R41.840 ATTENTION AND CONCENTRATION DEFICIT: ICD-10-CM

## 2019-09-10 RX ORDER — DEXTROAMPHETAMINE SACCHARATE, AMPHETAMINE ASPARTATE MONOHYDRATE, DEXTROAMPHETAMINE SULFATE AND AMPHETAMINE SULFATE 3.75; 3.75; 3.75; 3.75 MG/1; MG/1; MG/1; MG/1
15 CAPSULE, EXTENDED RELEASE ORAL
Qty: 30 CAP | Refills: 0 | Status: SHIPPED | OUTPATIENT
Start: 2019-10-10 | End: 2019-09-25 | Stop reason: SDUPTHER

## 2019-09-10 RX ORDER — DEXTROAMPHETAMINE SACCHARATE, AMPHETAMINE ASPARTATE MONOHYDRATE, DEXTROAMPHETAMINE SULFATE AND AMPHETAMINE SULFATE 3.75; 3.75; 3.75; 3.75 MG/1; MG/1; MG/1; MG/1
15 CAPSULE, EXTENDED RELEASE ORAL
Qty: 30 CAP | Refills: 0 | Status: SHIPPED | OUTPATIENT
Start: 2019-09-10 | End: 2019-09-25 | Stop reason: SDUPTHER

## 2019-09-11 NOTE — TELEPHONE ENCOUNTER
Patients mailbox is full, other number is not in service. Sent [employer] message that RX is ready for .

## 2019-09-25 ENCOUNTER — OFFICE VISIT (OUTPATIENT)
Dept: INTERNAL MEDICINE CLINIC | Age: 24
End: 2019-09-25

## 2019-09-25 VITALS
DIASTOLIC BLOOD PRESSURE: 60 MMHG | HEART RATE: 56 BPM | RESPIRATION RATE: 16 BRPM | SYSTOLIC BLOOD PRESSURE: 98 MMHG | OXYGEN SATURATION: 98 % | BODY MASS INDEX: 23.83 KG/M2 | TEMPERATURE: 97.8 F | WEIGHT: 151.8 LBS | HEIGHT: 67 IN

## 2019-09-25 DIAGNOSIS — R41.840 ATTENTION AND CONCENTRATION DEFICIT: ICD-10-CM

## 2019-09-25 RX ORDER — DEXTROAMPHETAMINE SACCHARATE, AMPHETAMINE ASPARTATE MONOHYDRATE, DEXTROAMPHETAMINE SULFATE AND AMPHETAMINE SULFATE 3.75; 3.75; 3.75; 3.75 MG/1; MG/1; MG/1; MG/1
15 CAPSULE, EXTENDED RELEASE ORAL
Qty: 30 CAP | Refills: 0 | Status: SHIPPED | OUTPATIENT
Start: 2019-11-24 | End: 2020-02-04 | Stop reason: SDUPTHER

## 2019-09-25 RX ORDER — DEXTROAMPHETAMINE SACCHARATE, AMPHETAMINE ASPARTATE MONOHYDRATE, DEXTROAMPHETAMINE SULFATE AND AMPHETAMINE SULFATE 3.75; 3.75; 3.75; 3.75 MG/1; MG/1; MG/1; MG/1
15 CAPSULE, EXTENDED RELEASE ORAL
Qty: 30 CAP | Refills: 0 | Status: SHIPPED | OUTPATIENT
Start: 2019-10-25 | End: 2020-02-04 | Stop reason: SDUPTHER

## 2019-09-25 RX ORDER — DEXTROAMPHETAMINE SACCHARATE, AMPHETAMINE ASPARTATE MONOHYDRATE, DEXTROAMPHETAMINE SULFATE AND AMPHETAMINE SULFATE 3.75; 3.75; 3.75; 3.75 MG/1; MG/1; MG/1; MG/1
15 CAPSULE, EXTENDED RELEASE ORAL
Qty: 30 CAP | Refills: 0 | Status: SHIPPED | OUTPATIENT
Start: 2019-09-25 | End: 2020-02-04 | Stop reason: SDUPTHER

## 2019-09-25 NOTE — PROGRESS NOTES
Marina Oshea is a 30 y.o. male who was seen in clinic today (9/25/2019). Assessment & Plan:     Diagnoses and all orders for this visit:    1. Attention and concentration deficit- well controlled, continue current treatment pending, [facility]  reviewed  -     amphetamine-dextroamphetamine XR (ADDERALL XR) 15 mg XR capsule; Take 1 Cap by mouth every morning for 30 days. Max Daily Amount: 15 mg.  -     amphetamine-dextroamphetamine XR (ADDERALL XR) 15 mg XR capsule; Take 1 Cap by mouth every morning for 30 days. Max Daily Amount: 15 mg.  -     amphetamine-dextroamphetamine XR (ADDERALL XR) 15 mg XR capsule; Take 1 Cap by mouth every morning for 30 days. Max Daily Amount: 15 mg. He is fasting and will get labs from last visit done today. Follow-up and Dispositions    · Return in about 6 months (around 3/25/2020) for Regular follow up. Subjective:   Renetta Dumont was seen today for Attention Deficit Disorder    Mental Health Review  Patient is seen for ADHD. Current treatment regimen includes: Adderall XR. Medication compliance: weekends and school holidays off. Just using meds on work days. Has been out of meds x 4 days. Pt reports the following side effects: nothing. [facility]  reviewed. Brief Labs:   No results found for: NA, K, CREA, CREATEXT, CHOL, HDL, LDLC, LDL, LDLCEXT, TRIGL, HBA1C, [id number], TSH, BTX2SEXD, TSHEXT       Prior to Admission medications    Medication Sig Start Date End Date Taking? Authorizing Provider   amphetamine-dextroamphetamine XR (ADDERALL XR) 15 mg XR capsule Take 1 Cap by mouth every morning for 30 days. Max Daily Amount: 15 mg. 9/10/19 10/10/19 Yes Chiquita Gardner MD   valACYclovir (VALTREX) 500 mg tablet Take 2 Tabs by mouth two (2) times daily as needed (cold sores). 5/20/19  Yes Chiquita Gardner MD          No Known Allergies        Review of Systems   Respiratory: Negative for cough and shortness of breath.     Cardiovascular: Negative for chest pain and palpitations. Gastrointestinal: Negative for abdominal pain, constipation, diarrhea, nausea and vomiting. Objective:   Physical Exam   Cardiovascular: Regular rhythm and normal heart sounds. Bradycardia present. No murmur heard. Pulmonary/Chest: Effort normal and breath sounds normal. He has no wheezes. He has no rales. Abdominal: Soft. Bowel sounds are normal. He exhibits no mass. There is no hepatosplenomegaly. There is no tenderness. Visit Vitals  BP 98/60   Pulse (!) 56   Temp 97.8 °F (36.6 °C) (Oral)   Resp 16   Ht 5' 7\" (1.702 m)   Wt 151 lb 12.8 oz (68.9 kg)   SpO2 98%   BMI 23.78 kg/m²         Disclaimer:  Advised him to call back or return to office if symptoms worsen/change/persist.  Discussed expected course/resolution/complications of diagnosis in detail with patient. Medication risks/benefits/costs/interactions/alternatives discussed with patient. He was given an after visit summary which includes diagnoses, current medications, & vitals. He expressed understanding with the diagnosis and plan. Aspects of this note may have been generated using voice recognition software. Despite editing, there may be some syntax errors.        Gabe Lopez MD

## 2019-09-26 LAB
ALBUMIN SERPL-MCNC: 5.1 G/DL (ref 3.5–5.5)
ALBUMIN/GLOB SERPL: 2 {RATIO} (ref 1.2–2.2)
ALP SERPL-CCNC: 72 IU/L (ref 39–117)
ALT SERPL-CCNC: 20 IU/L (ref 0–44)
AST SERPL-CCNC: 19 IU/L (ref 0–40)
BILIRUB SERPL-MCNC: 0.4 MG/DL (ref 0–1.2)
BUN SERPL-MCNC: 10 MG/DL (ref 6–20)
BUN/CREAT SERPL: 11 (ref 9–20)
CALCIUM SERPL-MCNC: 10.1 MG/DL (ref 8.7–10.2)
CHLORIDE SERPL-SCNC: 102 MMOL/L (ref 96–106)
CHOLEST SERPL-MCNC: 185 MG/DL (ref 100–199)
CO2 SERPL-SCNC: 22 MMOL/L (ref 20–29)
CREAT SERPL-MCNC: 0.94 MG/DL (ref 0.76–1.27)
ERYTHROCYTE [DISTWIDTH] IN BLOOD BY AUTOMATED COUNT: 13.6 % (ref 12.3–15.4)
GLOBULIN SER CALC-MCNC: 2.6 G/DL (ref 1.5–4.5)
GLUCOSE SERPL-MCNC: 83 MG/DL (ref 65–99)
HCT VFR BLD AUTO: 46.6 % (ref 37.5–51)
HDLC SERPL-MCNC: 48 MG/DL
HGB BLD-MCNC: 16.1 G/DL (ref 13–17.7)
LDLC SERPL CALC-MCNC: 121 MG/DL (ref 0–99)
MCH RBC QN AUTO: 29.9 PG (ref 26.6–33)
MCHC RBC AUTO-ENTMCNC: 34.5 G/DL (ref 31.5–35.7)
MCV RBC AUTO: 87 FL (ref 79–97)
PLATELET # BLD AUTO: 212 X10E3/UL (ref 150–450)
POTASSIUM SERPL-SCNC: 4.8 MMOL/L (ref 3.5–5.2)
PROT SERPL-MCNC: 7.7 G/DL (ref 6–8.5)
RBC # BLD AUTO: 5.39 X10E6/UL (ref 4.14–5.8)
SODIUM SERPL-SCNC: 142 MMOL/L (ref 134–144)
TRIGL SERPL-MCNC: 79 MG/DL (ref 0–149)
VLDLC SERPL CALC-MCNC: 16 MG/DL (ref 5–40)
WBC # BLD AUTO: 5.2 X10E3/UL (ref 3.4–10.8)

## 2019-09-26 NOTE — PROGRESS NOTES
Results released to patient via [email or web address]. All labs are stable or at goal for him. Ordered on 5/20, completed on 9/25.

## 2020-02-04 ENCOUNTER — OFFICE VISIT (OUTPATIENT)
Dept: INTERNAL MEDICINE CLINIC | Age: 25
End: 2020-02-04

## 2020-02-04 VITALS
TEMPERATURE: 98.4 F | WEIGHT: 165 LBS | OXYGEN SATURATION: 97 % | DIASTOLIC BLOOD PRESSURE: 68 MMHG | BODY MASS INDEX: 25.9 KG/M2 | RESPIRATION RATE: 16 BRPM | SYSTOLIC BLOOD PRESSURE: 116 MMHG | HEART RATE: 97 BPM | HEIGHT: 67 IN

## 2020-02-04 DIAGNOSIS — R41.840 ATTENTION AND CONCENTRATION DEFICIT: Primary | ICD-10-CM

## 2020-02-04 DIAGNOSIS — E66.3 OVERWEIGHT (BMI 25.0-29.9): ICD-10-CM

## 2020-02-04 RX ORDER — DEXTROAMPHETAMINE SACCHARATE, AMPHETAMINE ASPARTATE MONOHYDRATE, DEXTROAMPHETAMINE SULFATE AND AMPHETAMINE SULFATE 3.75; 3.75; 3.75; 3.75 MG/1; MG/1; MG/1; MG/1
15 CAPSULE, EXTENDED RELEASE ORAL
Qty: 30 CAP | Refills: 0 | Status: CANCELLED | OUTPATIENT
Start: 2020-03-05 | End: 2020-04-04

## 2020-02-04 RX ORDER — DEXTROAMPHETAMINE SACCHARATE, AMPHETAMINE ASPARTATE MONOHYDRATE, DEXTROAMPHETAMINE SULFATE AND AMPHETAMINE SULFATE 3.75; 3.75; 3.75; 3.75 MG/1; MG/1; MG/1; MG/1
15 CAPSULE, EXTENDED RELEASE ORAL
Qty: 30 CAP | Refills: 0 | Status: CANCELLED | OUTPATIENT
Start: 2020-04-04 | End: 2020-05-04

## 2020-02-04 RX ORDER — DEXTROAMPHETAMINE SACCHARATE, AMPHETAMINE ASPARTATE MONOHYDRATE, DEXTROAMPHETAMINE SULFATE AND AMPHETAMINE SULFATE 3.75; 3.75; 3.75; 3.75 MG/1; MG/1; MG/1; MG/1
15 CAPSULE, EXTENDED RELEASE ORAL
COMMUNITY
End: 2020-06-10 | Stop reason: SDUPTHER

## 2020-02-04 RX ORDER — DEXTROAMPHETAMINE SACCHARATE, AMPHETAMINE ASPARTATE MONOHYDRATE, DEXTROAMPHETAMINE SULFATE AND AMPHETAMINE SULFATE 3.75; 3.75; 3.75; 3.75 MG/1; MG/1; MG/1; MG/1
15 CAPSULE, EXTENDED RELEASE ORAL
Qty: 30 CAP | Refills: 0 | Status: SHIPPED | OUTPATIENT
Start: 2020-03-05 | End: 2020-04-04

## 2020-02-04 RX ORDER — DEXTROAMPHETAMINE SACCHARATE, AMPHETAMINE ASPARTATE MONOHYDRATE, DEXTROAMPHETAMINE SULFATE AND AMPHETAMINE SULFATE 3.75; 3.75; 3.75; 3.75 MG/1; MG/1; MG/1; MG/1
15 CAPSULE, EXTENDED RELEASE ORAL
Qty: 30 CAP | Refills: 0 | Status: CANCELLED | OUTPATIENT
Start: 2020-02-04 | End: 2020-03-05

## 2020-02-04 RX ORDER — DEXTROAMPHETAMINE SACCHARATE, AMPHETAMINE ASPARTATE MONOHYDRATE, DEXTROAMPHETAMINE SULFATE AND AMPHETAMINE SULFATE 3.75; 3.75; 3.75; 3.75 MG/1; MG/1; MG/1; MG/1
15 CAPSULE, EXTENDED RELEASE ORAL
Qty: 30 CAP | Refills: 0 | Status: SHIPPED | OUTPATIENT
Start: 2020-04-04 | End: 2020-05-04

## 2020-02-04 RX ORDER — DEXTROAMPHETAMINE SACCHARATE, AMPHETAMINE ASPARTATE MONOHYDRATE, DEXTROAMPHETAMINE SULFATE AND AMPHETAMINE SULFATE 3.75; 3.75; 3.75; 3.75 MG/1; MG/1; MG/1; MG/1
15 CAPSULE, EXTENDED RELEASE ORAL
Qty: 30 CAP | Refills: 0 | Status: SHIPPED | OUTPATIENT
Start: 2020-02-04 | End: 2020-03-05

## 2020-02-04 NOTE — PROGRESS NOTES
Ambrosio Vega is a 30 y.o. male who was seen in clinic today (2/4/2020). Assessment & Plan:   Diagnoses and all orders for this visit:    1. Attention and concentration deficit- well controlled, continue current treatment, [facility]  reviewed   -     amphetamine-dextroamphetamine XR (ADDERALL XR) 15 mg XR capsule; Take 1 Cap by mouth every morning for 30 days. Max Daily Amount: 15 mg.  -     amphetamine-dextroamphetamine XR (ADDERALL XR) 15 mg XR capsule; Take 1 Cap by mouth every morning for 30 days. Max Daily Amount: 15 mg.  -     amphetamine-dextroamphetamine XR (ADDERALL XR) 15 mg XR capsule; Take 1 Cap by mouth every morning for 30 days. Max Daily Amount: 15 mg.    2. Overweight (BMI 25.0-29.9) -new dx, weight is up 10-15 lbs, I have recommended the following interventions: encourage exercise and lifestyle education regarding diet. Follow-up and Dispositions    · Return if symptoms worsen or fail to improve. Subjective:   Franci Barrett was seen today for Attention Deficit Disorder    Mental Health Review  Patient is seen for ADHD. Current treatment regimen includes: Adderall XR. Medication compliance: weekends and school holidays off. Pt reports the following side effects: nothing. [facility]  reviewed. Brief Labs:     Lab Results   Component Value Date/Time    Sodium 142 09/25/2019 10:08 AM    Potassium 4.8 09/25/2019 10:08 AM    Creatinine 0.94 09/25/2019 10:08 AM    Cholesterol, total 185 09/25/2019 10:08 AM    HDL Cholesterol 48 09/25/2019 10:08 AM    LDL, calculated 121 09/25/2019 10:08 AM    Triglyceride 79 09/25/2019 10:08 AM          Prior to Admission medications    Medication Sig Start Date End Date Taking? Authorizing Provider   amphetamine-dextroamphetamine XR (ADDERALL XR) 15 mg XR capsule Take 15 mg by mouth every morning. Yes Provider, Historical   valACYclovir (VALTREX) 500 mg tablet Take 2 Tabs by mouth two (2) times daily as needed (cold sores).  5/20/19  Yes Devi Benson, Tremaine Smiley MD          No Known Allergies        Review of Systems   Respiratory: Negative for cough and shortness of breath. Cardiovascular: Negative for chest pain and palpitations. Gastrointestinal: Negative for abdominal pain, constipation, diarrhea, nausea and vomiting. Psychiatric/Behavioral: Negative for depression. The patient is not nervous/anxious and does not have insomnia. Objective:   Physical Exam  Cardiovascular:      Rate and Rhythm: Regular rhythm. Heart sounds: Normal heart sounds. No murmur. Pulmonary:      Effort: Pulmonary effort is normal.      Breath sounds: Normal breath sounds. No wheezing or rales. Abdominal:      General: Bowel sounds are normal.      Palpations: Abdomen is soft. There is no mass. Tenderness: There is no abdominal tenderness. Visit Vitals  /68   Pulse 97   Temp 98.4 °F (36.9 °C) (Oral)   Resp 16   Ht 5' 7\" (1.702 m)   Wt 165 lb (74.8 kg)   SpO2 97%   BMI 25.84 kg/m²         Disclaimer:  Advised him to call back or return to office if symptoms worsen/change/persist.  Discussed expected course/resolution/complications of diagnosis in detail with patient. Medication risks/benefits/costs/interactions/alternatives discussed with patient. He was given an after visit summary which includes diagnoses, current medications, & vitals. He expressed understanding with the diagnosis and plan. Aspects of this note may have been generated using voice recognition software. Despite editing, there may be some syntax errors.        Hayley Serrato MD

## 2020-06-10 ENCOUNTER — VIRTUAL VISIT (OUTPATIENT)
Dept: INTERNAL MEDICINE CLINIC | Age: 25
End: 2020-06-10

## 2020-06-10 DIAGNOSIS — R41.840 ATTENTION AND CONCENTRATION DEFICIT: Primary | ICD-10-CM

## 2020-06-10 DIAGNOSIS — Z71.89 EDUCATED ABOUT COVID-19 VIRUS INFECTION: ICD-10-CM

## 2020-06-10 RX ORDER — DEXTROAMPHETAMINE SACCHARATE, AMPHETAMINE ASPARTATE MONOHYDRATE, DEXTROAMPHETAMINE SULFATE AND AMPHETAMINE SULFATE 3.75; 3.75; 3.75; 3.75 MG/1; MG/1; MG/1; MG/1
15 CAPSULE, EXTENDED RELEASE ORAL
Qty: 30 CAP | Refills: 0 | Status: CANCELLED | OUTPATIENT
Start: 2020-08-09

## 2020-06-10 RX ORDER — DEXTROAMPHETAMINE SACCHARATE, AMPHETAMINE ASPARTATE MONOHYDRATE, DEXTROAMPHETAMINE SULFATE AND AMPHETAMINE SULFATE 3.75; 3.75; 3.75; 3.75 MG/1; MG/1; MG/1; MG/1
15 CAPSULE, EXTENDED RELEASE ORAL
Qty: 30 CAP | Refills: 0 | Status: SHIPPED | OUTPATIENT
Start: 2020-07-10 | End: 2020-12-16 | Stop reason: SDUPTHER

## 2020-06-10 RX ORDER — DEXTROAMPHETAMINE SACCHARATE, AMPHETAMINE ASPARTATE MONOHYDRATE, DEXTROAMPHETAMINE SULFATE AND AMPHETAMINE SULFATE 3.75; 3.75; 3.75; 3.75 MG/1; MG/1; MG/1; MG/1
15 CAPSULE, EXTENDED RELEASE ORAL
Qty: 30 CAP | Refills: 0 | Status: SHIPPED | OUTPATIENT
Start: 2020-06-10 | End: 2020-12-16 | Stop reason: SDUPTHER

## 2020-06-10 NOTE — PROGRESS NOTES
Ashwin Garcia is a 30 y.o. male who was seen by synchronous (real-time) audio-video technology on 6/10/2020. Consent: Ashwin Garcia who was seen by synchronous (real-time) audio-video technology, and/or his healthcare decision maker, is aware that this patient-initiated, Telehealth encounter on 6/10/2020 is a billable service, with coverage as determined by his insurance carrier. He is aware that he may receive a bill and has provided verbal consent to proceed: Yes. Patient identification was verified prior to start of the visit. A caregiver was present when appropriate. Due to this being a TeleHealth encounter (during [facility] emergency), evaluation of the following organ systems was limited: VS/Constituional/EENT/Resp/CV/GI//MS/Neuro/Skin/Heme-Lymph-Imm. Pursuant to the emergency declaration under the [facility] [place] [id number] waiver authority and the [employer] Act, this Virtual Visit was conducted, with patient's (and/or legal guardian's) consent, to reduce the patient's risk of exposure to COVID-19 and provide necessary medical care. Services were provided through a synchronous discussion virtually to substitute for in-person clinic visit. I was in the office. The patient was at home. Assessment & Plan:     Diagnoses and all orders for this visit:    1. Attention and concentration deficit- well controlled, continue current treatment  -     amphetamine-dextroamphetamine XR (Adderall XR) 15 mg XR capsule; Take 1 Cap by mouth every morning for 30 days. Max Daily Amount: 15 mg.  -     amphetamine-dextroamphetamine XR (ADDERALL XR) 15 mg XR capsule; Take 1 Cap by mouth every morning for 30 days. Max Daily Amount: 15 mg.    2. Educated about COVID-19 virus infection- reviewed guidelines and recommendations, he is using a mask, he is back to work, s/s of infection reviewed.       Follow-up and Dispositions    · Return in about 6 months (around 12/10/2020) for Regular follow up. Subjective:   Edie Mancia was seen for Attention Deficit Disorder    Mental Health Review  Patient is seen for ADHD. Current treatment regimen includes: Adderall XR. Medication compliance: weekends and school holidays off. Pt reports the following side effects: nothing. [facility]  reviewed. Lat filled on 5/8 and 3/30. Last PDMP Patito Castro as Reviewed:  Review User Review Instant Review Result   ASHWINI KULKARNI FREDY 6/10/2020  9:33 AM Reviewed PDMP [1]            Prior to Admission medications    Medication Sig Start Date End Date Taking? Authorizing Provider   amphetamine-dextroamphetamine XR (ADDERALL XR) 15 mg XR capsule Take 15 mg by mouth every morning. Yes Provider, Historical   valACYclovir (VALTREX) 500 mg tablet Take 2 Tabs by mouth two (2) times daily as needed (cold sores). 5/20/19  Yes Atul Hernandez MD       No Known Allergies      Review of Systems   Constitutional: Negative for chills and fever. Respiratory: Negative for cough and shortness of breath. Cardiovascular: Negative for chest pain and palpitations. Gastrointestinal: Negative for abdominal pain, constipation, diarrhea, nausea and vomiting. Neurological: Negative for headaches. Psychiatric/Behavioral: Negative for depression (mood slightly worse when working at home, but improved now back to work). The patient is not nervous/anxious. Objective:     General: alert, cooperative, no distress   Mental  status: normal mood, behavior, speech, dress, motor activity, and thought processes   Eyes: normal sclera   Mouth:    Neck:    Resp: normal effort and no respiratory distress   Neuro: no gross deficits   Musculoskeletal:    Skin:    Psychiatric: normal affect         We discussed the expected course, resolution and complications of the diagnosis(es) in detail.   Medication risks, benefits, costs, interactions, and alternatives were discussed as indicated. I advised him to contact the office if his condition worsens, changes or fails to improve as anticipated. He expressed understanding with the diagnosis(es) and plan.      Yevgeniy Ferguson MD

## 2020-09-01 ENCOUNTER — OFFICE VISIT (OUTPATIENT)
Dept: INTERNAL MEDICINE CLINIC | Age: 25
End: 2020-09-01
Payer: COMMERCIAL

## 2020-09-01 VITALS
TEMPERATURE: 97.8 F | WEIGHT: 164 LBS | HEART RATE: 69 BPM | BODY MASS INDEX: 25.74 KG/M2 | SYSTOLIC BLOOD PRESSURE: 101 MMHG | RESPIRATION RATE: 14 BRPM | DIASTOLIC BLOOD PRESSURE: 64 MMHG | OXYGEN SATURATION: 97 % | HEIGHT: 67 IN

## 2020-09-01 DIAGNOSIS — R41.840 ATTENTION AND CONCENTRATION DEFICIT: Primary | ICD-10-CM

## 2020-09-01 PROCEDURE — 99213 OFFICE O/P EST LOW 20 MIN: CPT | Performed by: INTERNAL MEDICINE

## 2020-09-01 RX ORDER — DEXTROAMPHETAMINE SACCHARATE, AMPHETAMINE ASPARTATE MONOHYDRATE, DEXTROAMPHETAMINE SULFATE AND AMPHETAMINE SULFATE 3.75; 3.75; 3.75; 3.75 MG/1; MG/1; MG/1; MG/1
15 CAPSULE, EXTENDED RELEASE ORAL
COMMUNITY
End: 2020-09-01 | Stop reason: SDUPTHER

## 2020-09-01 RX ORDER — DEXTROAMPHETAMINE SACCHARATE, AMPHETAMINE ASPARTATE MONOHYDRATE, DEXTROAMPHETAMINE SULFATE AND AMPHETAMINE SULFATE 3.75; 3.75; 3.75; 3.75 MG/1; MG/1; MG/1; MG/1
15 CAPSULE, EXTENDED RELEASE ORAL
Qty: 30 CAP | Refills: 0 | Status: SHIPPED | OUTPATIENT
Start: 2020-10-01 | End: 2020-10-31

## 2020-09-01 RX ORDER — DEXTROAMPHETAMINE SACCHARATE, AMPHETAMINE ASPARTATE MONOHYDRATE, DEXTROAMPHETAMINE SULFATE AND AMPHETAMINE SULFATE 3.75; 3.75; 3.75; 3.75 MG/1; MG/1; MG/1; MG/1
15 CAPSULE, EXTENDED RELEASE ORAL
Qty: 30 CAP | Refills: 0 | Status: SHIPPED | OUTPATIENT
Start: 2020-10-31 | End: 2020-12-15 | Stop reason: SDUPTHER

## 2020-09-01 RX ORDER — DEXTROAMPHETAMINE SACCHARATE, AMPHETAMINE ASPARTATE MONOHYDRATE, DEXTROAMPHETAMINE SULFATE AND AMPHETAMINE SULFATE 3.75; 3.75; 3.75; 3.75 MG/1; MG/1; MG/1; MG/1
15 CAPSULE, EXTENDED RELEASE ORAL
Qty: 30 CAP | Refills: 0 | Status: SHIPPED | OUTPATIENT
Start: 2020-09-01 | End: 2021-03-18 | Stop reason: SDUPTHER

## 2020-09-01 NOTE — PROGRESS NOTES
Madhavi Rivera is a 30 y.o. male who was seen in clinic today (9/1/2020). Assessment & Plan:     Diagnoses and all orders for this visit:    1. Attention and concentration deficit- well controlled, continue current treatment   -     amphetamine-dextroamphetamine XR (Adderall XR) 15 mg XR capsule; Take 1 Cap by mouth every morning for 30 days. Max Daily Amount: 15 mg.  -     amphetamine-dextroamphetamine XR (ADDERALL XR) 15 mg XR capsule; Take 1 Cap by mouth every morning for 30 days. Max Daily Amount: 15 mg.  -     amphetamine-dextroamphetamine XR (ADDERALL XR) 15 mg XR capsule; Take 1 Cap by mouth every morning. Max Daily Amount: 15 mg. Follow-up and Dispositions    · Return in about 6 months (around 3/1/2021) for Regular follow up. Subjective:   Ghislaine Ortiz was seen today for Attention Deficit Disorder        Since last visit: had some GI issues, tested negative for COVID. Resolved w/ Mag Citrate. No further issues. Mental Health Review  Patient is seen for ADHD. Current treatment regimen includes: Adderall XR. Medication compliance: all of the time. Pt reports the following side effects: nothing. [facility]  reviewed. Last PDMP Robert Trezevant as Reviewed:  Review User Review Instant Review Result   ASHWINI KULKARNI 9/1/2020  8:15 AM Reviewed PDMP [1]          Prior to Admission medications    Medication Sig Start Date End Date Taking? Authorizing Provider   amphetamine-dextroamphetamine XR (Adderall XR) 15 mg XR capsule Take 15 mg by mouth every morning. Yes Provider, Historical   valACYclovir (VALTREX) 500 mg tablet Take 2 Tabs by mouth two (2) times daily as needed (cold sores). 5/20/19  Yes Esdras Espinal MD          No Known Allergies        Review of Systems   Constitutional: Negative for chills, fever and malaise/fatigue. Respiratory: Negative for cough and shortness of breath. Cardiovascular: Negative for chest pain and palpitations.    Gastrointestinal: Negative for abdominal pain, constipation, diarrhea, nausea and vomiting. Psychiatric/Behavioral: Negative for depression. The patient is not nervous/anxious and does not have insomnia. Objective:   Physical Exam  Cardiovascular:      Rate and Rhythm: Regular rhythm. Heart sounds: Normal heart sounds. No murmur. Pulmonary:      Effort: Pulmonary effort is normal.      Breath sounds: Normal breath sounds. No wheezing or rales. Abdominal:      General: Bowel sounds are normal.      Palpations: Abdomen is soft. There is no mass. Tenderness: There is no abdominal tenderness. Psychiatric:         Mood and Affect: Mood normal.         Behavior: Behavior normal.           Visit Vitals  /64   Pulse 69   Temp 97.8 °F (36.6 °C) (Oral)   Resp 14   Ht 5' 7\" (1.702 m)   Wt 164 lb (74.4 kg)   SpO2 97%   BMI 25.69 kg/m²         Disclaimer:  Advised him to call back or return to office if symptoms worsen/change/persist.  Discussed expected course/resolution/complications of diagnosis in detail with patient. Medication risks/benefits/costs/interactions/alternatives discussed with patient. He was given an after visit summary which includes diagnoses, current medications, & vitals. He expressed understanding with the diagnosis and plan. Aspects of this note may have been generated using voice recognition software. Despite editing, there may be some syntax errors.        Dion Zarate MD

## 2020-12-15 DIAGNOSIS — R41.840 ATTENTION AND CONCENTRATION DEFICIT: ICD-10-CM

## 2020-12-16 RX ORDER — DEXTROAMPHETAMINE SACCHARATE, AMPHETAMINE ASPARTATE MONOHYDRATE, DEXTROAMPHETAMINE SULFATE AND AMPHETAMINE SULFATE 3.75; 3.75; 3.75; 3.75 MG/1; MG/1; MG/1; MG/1
15 CAPSULE, EXTENDED RELEASE ORAL
Qty: 30 CAP | Refills: 0 | Status: SHIPPED | OUTPATIENT
Start: 2021-02-14 | End: 2021-06-30 | Stop reason: SDUPTHER

## 2020-12-16 RX ORDER — DEXTROAMPHETAMINE SACCHARATE, AMPHETAMINE ASPARTATE MONOHYDRATE, DEXTROAMPHETAMINE SULFATE AND AMPHETAMINE SULFATE 3.75; 3.75; 3.75; 3.75 MG/1; MG/1; MG/1; MG/1
15 CAPSULE, EXTENDED RELEASE ORAL
Qty: 30 CAP | Refills: 0 | Status: SHIPPED | OUTPATIENT
Start: 2021-01-15 | End: 2021-03-18 | Stop reason: SDUPTHER

## 2020-12-16 RX ORDER — DEXTROAMPHETAMINE SACCHARATE, AMPHETAMINE ASPARTATE MONOHYDRATE, DEXTROAMPHETAMINE SULFATE AND AMPHETAMINE SULFATE 3.75; 3.75; 3.75; 3.75 MG/1; MG/1; MG/1; MG/1
15 CAPSULE, EXTENDED RELEASE ORAL
Qty: 30 CAP | Refills: 0 | Status: SHIPPED | OUTPATIENT
Start: 2020-12-16 | End: 2021-03-18 | Stop reason: SDUPTHER

## 2020-12-16 NOTE — TELEPHONE ENCOUNTER
Chart & VA  reviewed. Due for f/u in 3 months Last visit with me:  9/1/2020 Last refilled on: 11/14/20 No future appointments. Last PDMP Aden Manzano as Reviewed: 
Review User Review Instant Review Result ASHWINI KULKARNI 12/16/2020  9:40 AM Reviewed PDMP [1]

## 2021-03-18 ENCOUNTER — E-VISIT (OUTPATIENT)
Dept: INTERNAL MEDICINE CLINIC | Age: 26
End: 2021-03-18
Payer: COMMERCIAL

## 2021-03-18 DIAGNOSIS — R41.840 ATTENTION AND CONCENTRATION DEFICIT: Primary | ICD-10-CM

## 2021-03-18 PROCEDURE — 99421 OL DIG E/M SVC 5-10 MIN: CPT | Performed by: INTERNAL MEDICINE

## 2021-03-18 RX ORDER — DEXTROAMPHETAMINE SACCHARATE, AMPHETAMINE ASPARTATE MONOHYDRATE, DEXTROAMPHETAMINE SULFATE AND AMPHETAMINE SULFATE 3.75; 3.75; 3.75; 3.75 MG/1; MG/1; MG/1; MG/1
15 CAPSULE, EXTENDED RELEASE ORAL
Qty: 30 CAP | Refills: 0 | Status: SHIPPED | OUTPATIENT
Start: 2021-03-18 | End: 2021-04-17

## 2021-03-18 RX ORDER — DEXTROAMPHETAMINE SACCHARATE, AMPHETAMINE ASPARTATE MONOHYDRATE, DEXTROAMPHETAMINE SULFATE AND AMPHETAMINE SULFATE 3.75; 3.75; 3.75; 3.75 MG/1; MG/1; MG/1; MG/1
15 CAPSULE, EXTENDED RELEASE ORAL
Qty: 30 CAP | Refills: 0 | Status: SHIPPED | OUTPATIENT
Start: 2021-05-17 | End: 2021-10-01 | Stop reason: SDUPTHER

## 2021-03-18 RX ORDER — DEXTROAMPHETAMINE SACCHARATE, AMPHETAMINE ASPARTATE MONOHYDRATE, DEXTROAMPHETAMINE SULFATE AND AMPHETAMINE SULFATE 3.75; 3.75; 3.75; 3.75 MG/1; MG/1; MG/1; MG/1
15 CAPSULE, EXTENDED RELEASE ORAL
Qty: 30 CAP | Refills: 0 | Status: SHIPPED | OUTPATIENT
Start: 2021-04-17 | End: 2021-10-01 | Stop reason: SDUPTHER

## 2021-03-19 NOTE — TELEPHONE ENCOUNTER
E-Visit Note: HPI: per patient questionnaire, this has been reviewed. EXAM: n/a Chart & VA  reviewed. Last visit with me:  9/1/2020 Last refilled on: 2/20/21 No future appointments. Last PDMP Julia  as Reviewed: 
Review User Review Instant Review Result ASHWINI KULKARNI 3/18/2021  8:02 PM Reviewed PDMP [1]  
 
 
 
---------------------------------- 
Diagnoses and all orders for this visit: 1. Attention and concentration deficit 
-     amphetamine-dextroamphetamine XR (Adderall XR) 15 mg XR capsule; Take 1 Cap by mouth every morning for 30 days. Max Daily Amount: 15 mg. 
-     amphetamine-dextroamphetamine XR (ADDERALL XR) 15 mg XR capsule; Take 1 Cap by mouth every morning for 30 days. Max Daily Amount: 15 mg. 
-     amphetamine-dextroamphetamine XR (Adderall XR) 15 mg XR capsule; Take 1 Cap by mouth every morning for 30 days. Max Daily Amount: 15 mg. PLAN:  
Meds refilled x 3 months. No red flags. ---------------------------------- The patient was advised to call if these symptoms worsen or fail to improve as anticipated. 5-10 minutes were spent on the digital evaluation and management of this patient.   
 
 
Lorri Arambula MD

## 2021-06-04 ENCOUNTER — TELEPHONE (OUTPATIENT)
Dept: INTERNAL MEDICINE CLINIC | Age: 26
End: 2021-06-04

## 2021-06-24 DIAGNOSIS — R41.840 ATTENTION AND CONCENTRATION DEFICIT: ICD-10-CM

## 2021-06-24 RX ORDER — DEXTROAMPHETAMINE SACCHARATE, AMPHETAMINE ASPARTATE MONOHYDRATE, DEXTROAMPHETAMINE SULFATE AND AMPHETAMINE SULFATE 3.75; 3.75; 3.75; 3.75 MG/1; MG/1; MG/1; MG/1
15 CAPSULE, EXTENDED RELEASE ORAL
Qty: 30 CAPSULE | Refills: 0 | OUTPATIENT
Start: 2021-06-24 | End: 2021-07-24

## 2021-06-24 NOTE — TELEPHONE ENCOUNTER
Pt has had 2 no shows since last refill. Can not sent in medications until he is seen in the office and we discuss coming into scheduled appointments. This is a controlled medications.

## 2021-06-24 NOTE — TELEPHONE ENCOUNTER
[facility] [id number] [place] [facility] [place]  [phone]    PT scheduled for 06/30/2021 however will be out of medication today. Can a short term refill be called in to last until appointment?

## 2021-06-28 DIAGNOSIS — R41.840 ATTENTION AND CONCENTRATION DEFICIT: ICD-10-CM

## 2021-06-28 RX ORDER — DEXTROAMPHETAMINE SACCHARATE, AMPHETAMINE ASPARTATE MONOHYDRATE, DEXTROAMPHETAMINE SULFATE AND AMPHETAMINE SULFATE 3.75; 3.75; 3.75; 3.75 MG/1; MG/1; MG/1; MG/1
15 CAPSULE, EXTENDED RELEASE ORAL
Qty: 30 CAPSULE | Refills: 0 | OUTPATIENT
Start: 2021-06-28

## 2021-06-28 NOTE — TELEPHONE ENCOUNTER
Requested Prescriptions     Pending Prescriptions Disp Refills    amphetamine-dextroamphetamine XR (ADDERALL XR) 15 mg XR capsule 30 Capsule 0     Sig: Take 1 Capsule by mouth every morning. Max Daily Amount: 15 mg.              [facility] [address] [place] [employer] [facility], [facility] [address]

## 2021-06-29 ENCOUNTER — TELEPHONE (OUTPATIENT)
Dept: INTERNAL MEDICINE CLINIC | Age: 26
End: 2021-06-29

## 2021-06-30 ENCOUNTER — OFFICE VISIT (OUTPATIENT)
Dept: INTERNAL MEDICINE CLINIC | Age: 26
End: 2021-06-30
Payer: COMMERCIAL

## 2021-06-30 VITALS
HEART RATE: 75 BPM | SYSTOLIC BLOOD PRESSURE: 104 MMHG | DIASTOLIC BLOOD PRESSURE: 64 MMHG | WEIGHT: 158 LBS | RESPIRATION RATE: 14 BRPM | TEMPERATURE: 98 F | HEIGHT: 67 IN | OXYGEN SATURATION: 97 % | BODY MASS INDEX: 24.8 KG/M2

## 2021-06-30 DIAGNOSIS — R46.89 NON-COMPLIANT BEHAVIOR: ICD-10-CM

## 2021-06-30 DIAGNOSIS — R41.840 ATTENTION AND CONCENTRATION DEFICIT: Primary | ICD-10-CM

## 2021-06-30 PROCEDURE — 99213 OFFICE O/P EST LOW 20 MIN: CPT | Performed by: INTERNAL MEDICINE

## 2021-06-30 RX ORDER — DEXTROAMPHETAMINE SACCHARATE, AMPHETAMINE ASPARTATE MONOHYDRATE, DEXTROAMPHETAMINE SULFATE AND AMPHETAMINE SULFATE 3.75; 3.75; 3.75; 3.75 MG/1; MG/1; MG/1; MG/1
15 CAPSULE, EXTENDED RELEASE ORAL
Qty: 30 CAPSULE | Refills: 0 | Status: SHIPPED | OUTPATIENT
Start: 2021-08-29 | End: 2021-10-01 | Stop reason: SDUPTHER

## 2021-06-30 RX ORDER — DEXTROAMPHETAMINE SACCHARATE, AMPHETAMINE ASPARTATE MONOHYDRATE, DEXTROAMPHETAMINE SULFATE AND AMPHETAMINE SULFATE 3.75; 3.75; 3.75; 3.75 MG/1; MG/1; MG/1; MG/1
15 CAPSULE, EXTENDED RELEASE ORAL
Qty: 30 CAPSULE | Refills: 0 | Status: SHIPPED | OUTPATIENT
Start: 2021-06-30 | End: 2021-07-30

## 2021-06-30 RX ORDER — DEXTROAMPHETAMINE SACCHARATE, AMPHETAMINE ASPARTATE MONOHYDRATE, DEXTROAMPHETAMINE SULFATE AND AMPHETAMINE SULFATE 3.75; 3.75; 3.75; 3.75 MG/1; MG/1; MG/1; MG/1
15 CAPSULE, EXTENDED RELEASE ORAL
Qty: 30 CAPSULE | Refills: 0 | Status: SHIPPED | OUTPATIENT
Start: 2021-07-30 | End: 2021-08-29

## 2021-06-30 NOTE — PROGRESS NOTES
Greta Abreu is a 27 y.o. male who was seen in clinic today (6/30/2021). Assessment & Plan:   Below is the assessment and plan developed based on review of pertinent history, physical exam, labs, studies, and medications. 1. Attention and concentration deficit  Assessment & Plan:  Worse off medications, will restart, stressed following up regularly to appointments. Did review E-Visits vs Virtual visit options also   Orders:  -     amphetamine-dextroamphetamine XR (ADDERALL XR) 15 mg XR capsule; Take 1 Capsule by mouth every morning for 30 days. Max Daily Amount: 15 mg., Normal, Disp-30 Capsule, R-0  -     amphetamine-dextroamphetamine XR (ADDERALL XR) 15 mg XR capsule; Take 1 Capsule by mouth every morning for 30 days. Max Daily Amount: 15 mg., Normal, Disp-30 Capsule, R-0  -     amphetamine-dextroamphetamine XR (ADDERALL XR) 15 mg XR capsule; Take 1 Capsule by mouth every morning for 30 days. Max Daily Amount: 15 mg., Normal, Disp-30 Capsule, R-0  2. Non-compliant behavior  Comments:  reviewed last 2 no shows, I understand things change but needs to plan accordingly so we can, reviewed E-Visit and Virtual visits    He has wanted to talk about PrEP at one of his missed visits. Mentioned it today but he was pressed for time to get back to work and wanted to defer. He will call back to set up a virtual visit to review this. Follow-up and Dispositions    · Return in about 6 months (around 12/30/2021) for Regular follow up - Evisit vs Virtual Visit vs Office Visit. Subjective/Objective:   Jese Mtz was seen today for Attention Deficit Disorder      Since last visit: he has had 2 no shows (5/5 and 6/7) for scheduled appointments. Adderall has been denied refills until he was seen in the office. He was on his lunch break today and pressed for time. Mental Health Review  Patient is seen for ADHD. Current treatment regimen includes: Adderall XR. He has been off of meds x 7 days.   He reports mood was more variable, BM changes, and harder to get work done in a timely manner. Previously has been using meds: weekends and school holidays off. Pt reports the following side effects: nothing. [facility] when [facility] today). Last refill was on 5/24/21 - #30. Prior to Admission medications    Medication Sig Start Date End Date Taking? Authorizing Provider   valACYclovir (VALTREX) 500 mg tablet Take 2 Tabs by mouth two (2) times daily as needed (cold sores). 5/20/19  Yes Dario Moses MD   amphetamine-dextroamphetamine XR (ADDERALL XR) 15 mg XR capsule Take 1 Cap by mouth every morning. Max Daily Amount: 15 mg. 2/14/21 6/30/21  Dario Moses MD        Physical Exam  Constitutional:       Appearance: Normal appearance. Cardiovascular:      Rate and Rhythm: Regular rhythm. Heart sounds: Normal heart sounds. No murmur heard. Pulmonary:      Effort: Pulmonary effort is normal.      Breath sounds: Normal breath sounds.    Psychiatric:         Mood and Affect: Mood normal.         Behavior: Behavior normal.         Visit Vitals  /64   Pulse 75   Temp 98 °F (36.7 °C) (Temporal)   Resp 14   Ht 5' 7\" (1.702 m)   Wt 158 lb (71.7 kg)   SpO2 97%   BMI 24.75 kg/m²         Tru Bettencourt MD

## 2021-06-30 NOTE — ASSESSMENT & PLAN NOTE
Worse off medications, will restart, stressed following up regularly to appointments.   Did review E-Visits vs Virtual visit options also

## 2021-10-01 DIAGNOSIS — R41.840 ATTENTION AND CONCENTRATION DEFICIT: ICD-10-CM

## 2021-10-01 RX ORDER — DEXTROAMPHETAMINE SACCHARATE, AMPHETAMINE ASPARTATE MONOHYDRATE, DEXTROAMPHETAMINE SULFATE AND AMPHETAMINE SULFATE 3.75; 3.75; 3.75; 3.75 MG/1; MG/1; MG/1; MG/1
15 CAPSULE, EXTENDED RELEASE ORAL
Qty: 30 CAPSULE | Refills: 0 | Status: SHIPPED | OUTPATIENT
Start: 2021-11-30 | End: 2021-12-06 | Stop reason: SDUPTHER

## 2021-10-01 RX ORDER — DEXTROAMPHETAMINE SACCHARATE, AMPHETAMINE ASPARTATE MONOHYDRATE, DEXTROAMPHETAMINE SULFATE AND AMPHETAMINE SULFATE 3.75; 3.75; 3.75; 3.75 MG/1; MG/1; MG/1; MG/1
15 CAPSULE, EXTENDED RELEASE ORAL
Qty: 30 CAPSULE | Refills: 0 | Status: SHIPPED | OUTPATIENT
Start: 2021-10-31 | End: 2021-11-30

## 2021-10-01 RX ORDER — DEXTROAMPHETAMINE SACCHARATE, AMPHETAMINE ASPARTATE MONOHYDRATE, DEXTROAMPHETAMINE SULFATE AND AMPHETAMINE SULFATE 3.75; 3.75; 3.75; 3.75 MG/1; MG/1; MG/1; MG/1
15 CAPSULE, EXTENDED RELEASE ORAL
Qty: 30 CAPSULE | Refills: 0 | Status: SHIPPED | OUTPATIENT
Start: 2021-10-01 | End: 2021-10-31

## 2021-10-01 NOTE — TELEPHONE ENCOUNTER
Requested Prescriptions     Pending Prescriptions Disp Refills    amphetamine-dextroamphetamine XR (ADDERALL XR) 15 mg XR capsule 30 Capsule 0     Sig: Take 1 Capsule by mouth every morning for 30 days. Max Daily Amount: 15 mg.            [facility] [address] [place] [address], [address] [place], [place] [address]

## 2021-10-01 NOTE — TELEPHONE ENCOUNTER
[facility] & [facility]. Last visit with me:  6/30/2021   Last refilled on: 8/30. No future appointments.      Last PDMP Jazmin Humphrey as Reviewed:  Review User Review Instant Review Result   ASHWINI KULKARNI 10/1/2021  4:21 PM Reviewed PDMP [1]

## 2021-12-06 ENCOUNTER — E-VISIT (OUTPATIENT)
Dept: INTERNAL MEDICINE CLINIC | Age: 26
End: 2021-12-06
Payer: COMMERCIAL

## 2021-12-06 DIAGNOSIS — R41.840 ATTENTION AND CONCENTRATION DEFICIT: Primary | ICD-10-CM

## 2021-12-06 PROCEDURE — 99421 OL DIG E/M SVC 5-10 MIN: CPT | Performed by: INTERNAL MEDICINE

## 2021-12-06 RX ORDER — DEXTROAMPHETAMINE SACCHARATE, AMPHETAMINE ASPARTATE MONOHYDRATE, DEXTROAMPHETAMINE SULFATE AND AMPHETAMINE SULFATE 3.75; 3.75; 3.75; 3.75 MG/1; MG/1; MG/1; MG/1
15 CAPSULE, EXTENDED RELEASE ORAL DAILY
Qty: 30 CAPSULE | Refills: 0 | Status: SHIPPED | OUTPATIENT
Start: 2022-01-05 | End: 2022-02-03

## 2021-12-06 RX ORDER — DEXTROAMPHETAMINE SACCHARATE, AMPHETAMINE ASPARTATE MONOHYDRATE, DEXTROAMPHETAMINE SULFATE AND AMPHETAMINE SULFATE 3.75; 3.75; 3.75; 3.75 MG/1; MG/1; MG/1; MG/1
15 CAPSULE, EXTENDED RELEASE ORAL DAILY
Qty: 30 CAPSULE | Refills: 0 | Status: SHIPPED | OUTPATIENT
Start: 2021-12-06 | End: 2022-01-04

## 2021-12-06 RX ORDER — DEXTROAMPHETAMINE SACCHARATE, AMPHETAMINE ASPARTATE MONOHYDRATE, DEXTROAMPHETAMINE SULFATE AND AMPHETAMINE SULFATE 3.75; 3.75; 3.75; 3.75 MG/1; MG/1; MG/1; MG/1
15 CAPSULE, EXTENDED RELEASE ORAL DAILY
Qty: 30 CAPSULE | Refills: 0 | Status: SHIPPED | OUTPATIENT
Start: 2022-02-04 | End: 2022-03-05

## 2021-12-07 NOTE — PROGRESS NOTES
E-Visit Note:    HPI: per patient questionnaire, this has been reviewed  EXAM: n/a    [facility] on 11/4/21 - 15mg XR - #30.    ----------------------------------  Diagnoses and all orders for this visit:    1. Attention and concentration deficit  -     amphetamine-dextroamphetamine XR (ADDERALL XR) 15 mg XR capsule; Take 1 Capsule by mouth daily for 29 days. Max Daily Amount: 15 mg.  -     amphetamine-dextroamphetamine XR (ADDERALL XR) 15 mg XR capsule; Take 1 Capsule by mouth daily for 29 days. Max Daily Amount: 15 mg.  -     amphetamine-dextroamphetamine XR (ADDERALL XR) 15 mg XR capsule; Take 1 Capsule by mouth daily for 29 days. Max Daily Amount: 15 mg. PLAN:   Will refill again in March, due for f/u in June. Recently moved to [facility] so will need to establish w/ PCP there.    ----------------------------------  The patient was advised to call if these symptoms worsen or fail to improve as anticipated. 5-10 minutes were spent on the digital evaluation and management of this patient.        Charito Pyle MD

## 2023-05-17 RX ORDER — VALACYCLOVIR HYDROCHLORIDE 500 MG/1
1000 TABLET, FILM COATED ORAL 2 TIMES DAILY PRN
COMMUNITY
Start: 2019-05-20